# Patient Record
Sex: MALE | Race: OTHER | NOT HISPANIC OR LATINO | URBAN - METROPOLITAN AREA
[De-identification: names, ages, dates, MRNs, and addresses within clinical notes are randomized per-mention and may not be internally consistent; named-entity substitution may affect disease eponyms.]

---

## 2022-01-01 ENCOUNTER — INPATIENT (INPATIENT)
Facility: HOSPITAL | Age: 82
LOS: 5 days | DRG: 870 | End: 2022-01-14
Attending: INTERNAL MEDICINE | Admitting: INTERNAL MEDICINE
Payer: MEDICAID

## 2022-01-01 VITALS — TEMPERATURE: 98 F | OXYGEN SATURATION: 65 %

## 2022-01-01 VITALS
HEART RATE: 91 BPM | HEIGHT: 68 IN | DIASTOLIC BLOOD PRESSURE: 69 MMHG | TEMPERATURE: 100 F | OXYGEN SATURATION: 61 % | WEIGHT: 182.98 LBS | RESPIRATION RATE: 40 BRPM | SYSTOLIC BLOOD PRESSURE: 133 MMHG

## 2022-01-01 DIAGNOSIS — J96.01 ACUTE RESPIRATORY FAILURE WITH HYPOXIA: ICD-10-CM

## 2022-01-01 DIAGNOSIS — Z51.5 ENCOUNTER FOR PALLIATIVE CARE: ICD-10-CM

## 2022-01-01 DIAGNOSIS — N17.9 ACUTE KIDNEY FAILURE, UNSPECIFIED: ICD-10-CM

## 2022-01-01 LAB
A1C WITH ESTIMATED AVERAGE GLUCOSE RESULT: 7.3 % — HIGH (ref 4–5.6)
A1C WITH ESTIMATED AVERAGE GLUCOSE RESULT: 7.3 % — HIGH (ref 4–5.6)
ALBUMIN SERPL ELPH-MCNC: 1.5 G/DL — LOW (ref 3.5–5)
ALBUMIN SERPL ELPH-MCNC: 1.5 G/DL — LOW (ref 3.5–5)
ALBUMIN SERPL ELPH-MCNC: 1.9 G/DL — LOW (ref 3.5–5)
ALBUMIN SERPL ELPH-MCNC: 2 G/DL — LOW (ref 3.5–5)
ALBUMIN SERPL ELPH-MCNC: 2 G/DL — LOW (ref 3.5–5)
ALBUMIN SERPL ELPH-MCNC: 2.1 G/DL — LOW (ref 3.5–5)
ALBUMIN SERPL ELPH-MCNC: 2.5 G/DL — LOW (ref 3.5–5)
ALP SERPL-CCNC: 54 U/L — SIGNIFICANT CHANGE UP (ref 40–120)
ALP SERPL-CCNC: 55 U/L — SIGNIFICANT CHANGE UP (ref 40–120)
ALP SERPL-CCNC: 57 U/L — SIGNIFICANT CHANGE UP (ref 40–120)
ALP SERPL-CCNC: 58 U/L — SIGNIFICANT CHANGE UP (ref 40–120)
ALP SERPL-CCNC: 60 U/L — SIGNIFICANT CHANGE UP (ref 40–120)
ALP SERPL-CCNC: 61 U/L — SIGNIFICANT CHANGE UP (ref 40–120)
ALP SERPL-CCNC: 80 U/L — SIGNIFICANT CHANGE UP (ref 40–120)
ALT FLD-CCNC: 18 U/L DA — SIGNIFICANT CHANGE UP (ref 10–60)
ALT FLD-CCNC: 22 U/L DA — SIGNIFICANT CHANGE UP (ref 10–60)
ALT FLD-CCNC: 27 U/L DA — SIGNIFICANT CHANGE UP (ref 10–60)
ALT FLD-CCNC: 27 U/L DA — SIGNIFICANT CHANGE UP (ref 10–60)
ALT FLD-CCNC: 30 U/L DA — SIGNIFICANT CHANGE UP (ref 10–60)
ALT FLD-CCNC: 32 U/L DA — SIGNIFICANT CHANGE UP (ref 10–60)
ALT FLD-CCNC: 35 U/L DA — SIGNIFICANT CHANGE UP (ref 10–60)
ANION GAP SERPL CALC-SCNC: -1 MMOL/L — LOW (ref 5–17)
ANION GAP SERPL CALC-SCNC: 2 MMOL/L — LOW (ref 5–17)
ANION GAP SERPL CALC-SCNC: 4 MMOL/L — LOW (ref 5–17)
ANION GAP SERPL CALC-SCNC: 5 MMOL/L — SIGNIFICANT CHANGE UP (ref 5–17)
ANION GAP SERPL CALC-SCNC: 6 MMOL/L — SIGNIFICANT CHANGE UP (ref 5–17)
ANION GAP SERPL CALC-SCNC: 6 MMOL/L — SIGNIFICANT CHANGE UP (ref 5–17)
APPEARANCE UR: CLEAR — SIGNIFICANT CHANGE UP
AST SERPL-CCNC: 20 U/L — SIGNIFICANT CHANGE UP (ref 10–40)
AST SERPL-CCNC: 26 U/L — SIGNIFICANT CHANGE UP (ref 10–40)
AST SERPL-CCNC: 33 U/L — SIGNIFICANT CHANGE UP (ref 10–40)
AST SERPL-CCNC: 35 U/L — SIGNIFICANT CHANGE UP (ref 10–40)
AST SERPL-CCNC: 36 U/L — SIGNIFICANT CHANGE UP (ref 10–40)
AST SERPL-CCNC: 38 U/L — SIGNIFICANT CHANGE UP (ref 10–40)
AST SERPL-CCNC: 38 U/L — SIGNIFICANT CHANGE UP (ref 10–40)
BASE EXCESS BLDA CALC-SCNC: -0.1 MMOL/L — SIGNIFICANT CHANGE UP (ref -2–3)
BASE EXCESS BLDA CALC-SCNC: -1.4 MMOL/L — SIGNIFICANT CHANGE UP (ref -2–3)
BASE EXCESS BLDA CALC-SCNC: -7.2 MMOL/L — LOW (ref -2–3)
BASE EXCESS BLDA CALC-SCNC: 0.5 MMOL/L — SIGNIFICANT CHANGE UP (ref -2–3)
BASE EXCESS BLDA CALC-SCNC: 1.7 MMOL/L — SIGNIFICANT CHANGE UP (ref -2–3)
BASE EXCESS BLDA CALC-SCNC: 2.4 MMOL/L — SIGNIFICANT CHANGE UP (ref -2–3)
BASE EXCESS BLDA CALC-SCNC: 3.2 MMOL/L — HIGH (ref -2–3)
BASE EXCESS BLDA CALC-SCNC: 3.9 MMOL/L — HIGH (ref -2–3)
BASE EXCESS BLDA CALC-SCNC: 3.9 MMOL/L — HIGH (ref -2–3)
BASE EXCESS BLDA CALC-SCNC: 4.8 MMOL/L — HIGH (ref -2–3)
BASE EXCESS BLDA CALC-SCNC: 7.4 MMOL/L — HIGH (ref -2–3)
BASE EXCESS BLDA CALC-SCNC: 8 MMOL/L — HIGH (ref -2–3)
BASE EXCESS BLDV CALC-SCNC: 4.1 MMOL/L — SIGNIFICANT CHANGE UP
BASE EXCESS BLDV CALC-SCNC: 7.3 MMOL/L — SIGNIFICANT CHANGE UP
BASOPHILS # BLD AUTO: 0 K/UL — SIGNIFICANT CHANGE UP (ref 0–0.2)
BASOPHILS # BLD AUTO: 0.01 K/UL — SIGNIFICANT CHANGE UP (ref 0–0.2)
BASOPHILS # BLD AUTO: 0.02 K/UL — SIGNIFICANT CHANGE UP (ref 0–0.2)
BASOPHILS # BLD AUTO: 0.02 K/UL — SIGNIFICANT CHANGE UP (ref 0–0.2)
BASOPHILS # BLD AUTO: 0.05 K/UL — SIGNIFICANT CHANGE UP (ref 0–0.2)
BASOPHILS NFR BLD AUTO: 0 % — SIGNIFICANT CHANGE UP (ref 0–2)
BASOPHILS NFR BLD AUTO: 0.1 % — SIGNIFICANT CHANGE UP (ref 0–2)
BASOPHILS NFR BLD AUTO: 0.2 % — SIGNIFICANT CHANGE UP (ref 0–2)
BASOPHILS NFR BLD AUTO: 0.2 % — SIGNIFICANT CHANGE UP (ref 0–2)
BASOPHILS NFR BLD AUTO: 0.3 % — SIGNIFICANT CHANGE UP (ref 0–2)
BILIRUB SERPL-MCNC: 0.6 MG/DL — SIGNIFICANT CHANGE UP (ref 0.2–1.2)
BILIRUB SERPL-MCNC: 0.6 MG/DL — SIGNIFICANT CHANGE UP (ref 0.2–1.2)
BILIRUB SERPL-MCNC: 0.7 MG/DL — SIGNIFICANT CHANGE UP (ref 0.2–1.2)
BILIRUB SERPL-MCNC: 0.7 MG/DL — SIGNIFICANT CHANGE UP (ref 0.2–1.2)
BILIRUB SERPL-MCNC: 0.8 MG/DL — SIGNIFICANT CHANGE UP (ref 0.2–1.2)
BILIRUB SERPL-MCNC: 1 MG/DL — SIGNIFICANT CHANGE UP (ref 0.2–1.2)
BILIRUB SERPL-MCNC: 1.1 MG/DL — SIGNIFICANT CHANGE UP (ref 0.2–1.2)
BILIRUB UR-MCNC: ABNORMAL
BLOOD GAS COMMENTS ARTERIAL: SIGNIFICANT CHANGE UP
BUN SERPL-MCNC: 33 MG/DL — HIGH (ref 7–18)
BUN SERPL-MCNC: 33 MG/DL — HIGH (ref 7–18)
BUN SERPL-MCNC: 34 MG/DL — HIGH (ref 7–18)
BUN SERPL-MCNC: 36 MG/DL — HIGH (ref 7–18)
BUN SERPL-MCNC: 40 MG/DL — HIGH (ref 7–18)
BUN SERPL-MCNC: 46 MG/DL — HIGH (ref 7–18)
BUN SERPL-MCNC: 75 MG/DL — HIGH (ref 7–18)
BUN SERPL-MCNC: 80 MG/DL — HIGH (ref 7–18)
CALCIUM SERPL-MCNC: 7.8 MG/DL — LOW (ref 8.4–10.5)
CALCIUM SERPL-MCNC: 8.1 MG/DL — LOW (ref 8.4–10.5)
CALCIUM SERPL-MCNC: 8.1 MG/DL — LOW (ref 8.4–10.5)
CALCIUM SERPL-MCNC: 8.5 MG/DL — SIGNIFICANT CHANGE UP (ref 8.4–10.5)
CALCIUM SERPL-MCNC: 8.6 MG/DL — SIGNIFICANT CHANGE UP (ref 8.4–10.5)
CALCIUM SERPL-MCNC: 8.9 MG/DL — SIGNIFICANT CHANGE UP (ref 8.4–10.5)
CHLORIDE SERPL-SCNC: 100 MMOL/L — SIGNIFICANT CHANGE UP (ref 96–108)
CHLORIDE SERPL-SCNC: 106 MMOL/L — SIGNIFICANT CHANGE UP (ref 96–108)
CHLORIDE SERPL-SCNC: 108 MMOL/L — SIGNIFICANT CHANGE UP (ref 96–108)
CHLORIDE SERPL-SCNC: 110 MMOL/L — HIGH (ref 96–108)
CHLORIDE SERPL-SCNC: 110 MMOL/L — HIGH (ref 96–108)
CHLORIDE SERPL-SCNC: 111 MMOL/L — HIGH (ref 96–108)
CHLORIDE SERPL-SCNC: 113 MMOL/L — HIGH (ref 96–108)
CHLORIDE SERPL-SCNC: 115 MMOL/L — HIGH (ref 96–108)
CO2 SERPL-SCNC: 30 MMOL/L — SIGNIFICANT CHANGE UP (ref 22–31)
CO2 SERPL-SCNC: 31 MMOL/L — SIGNIFICANT CHANGE UP (ref 22–31)
CO2 SERPL-SCNC: 31 MMOL/L — SIGNIFICANT CHANGE UP (ref 22–31)
CO2 SERPL-SCNC: 32 MMOL/L — HIGH (ref 22–31)
CO2 SERPL-SCNC: 33 MMOL/L — HIGH (ref 22–31)
CO2 SERPL-SCNC: 34 MMOL/L — HIGH (ref 22–31)
COLOR SPEC: YELLOW — SIGNIFICANT CHANGE UP
CREAT SERPL-MCNC: 0.84 MG/DL — SIGNIFICANT CHANGE UP (ref 0.5–1.3)
CREAT SERPL-MCNC: 0.9 MG/DL — SIGNIFICANT CHANGE UP (ref 0.5–1.3)
CREAT SERPL-MCNC: 0.92 MG/DL — SIGNIFICANT CHANGE UP (ref 0.5–1.3)
CREAT SERPL-MCNC: 1.01 MG/DL — SIGNIFICANT CHANGE UP (ref 0.5–1.3)
CREAT SERPL-MCNC: 1.17 MG/DL — SIGNIFICANT CHANGE UP (ref 0.5–1.3)
CREAT SERPL-MCNC: 1.46 MG/DL — HIGH (ref 0.5–1.3)
CREAT SERPL-MCNC: 2.48 MG/DL — HIGH (ref 0.5–1.3)
CREAT SERPL-MCNC: 2.67 MG/DL — HIGH (ref 0.5–1.3)
CRP SERPL-MCNC: 142 MG/L — HIGH
CRP SERPL-MCNC: 149 MG/L — HIGH
CULTURE RESULTS: NO GROWTH — SIGNIFICANT CHANGE UP
CULTURE RESULTS: SIGNIFICANT CHANGE UP
CULTURE RESULTS: SIGNIFICANT CHANGE UP
D DIMER BLD IA.RAPID-MCNC: 1733 NG/ML DDU — HIGH
D DIMER BLD IA.RAPID-MCNC: 2107 NG/ML DDU — HIGH
D DIMER BLD IA.RAPID-MCNC: 2354 NG/ML DDU — HIGH
D DIMER BLD IA.RAPID-MCNC: 385 NG/ML DDU — HIGH
D DIMER BLD IA.RAPID-MCNC: 883 NG/ML DDU — HIGH
DIFF PNL FLD: ABNORMAL
EOSINOPHIL # BLD AUTO: 0 K/UL — SIGNIFICANT CHANGE UP (ref 0–0.5)
EOSINOPHIL # BLD AUTO: 0.01 K/UL — SIGNIFICANT CHANGE UP (ref 0–0.5)
EOSINOPHIL # BLD AUTO: 0.02 K/UL — SIGNIFICANT CHANGE UP (ref 0–0.5)
EOSINOPHIL # BLD AUTO: 0.03 K/UL — SIGNIFICANT CHANGE UP (ref 0–0.5)
EOSINOPHIL # BLD AUTO: 0.15 K/UL — SIGNIFICANT CHANGE UP (ref 0–0.5)
EOSINOPHIL NFR BLD AUTO: 0 % — SIGNIFICANT CHANGE UP (ref 0–6)
EOSINOPHIL NFR BLD AUTO: 0.1 % — SIGNIFICANT CHANGE UP (ref 0–6)
EOSINOPHIL NFR BLD AUTO: 0.1 % — SIGNIFICANT CHANGE UP (ref 0–6)
EOSINOPHIL NFR BLD AUTO: 0.3 % — SIGNIFICANT CHANGE UP (ref 0–6)
EOSINOPHIL NFR BLD AUTO: 1.4 % — SIGNIFICANT CHANGE UP (ref 0–6)
ESTIMATED AVERAGE GLUCOSE: 163 MG/DL — HIGH (ref 68–114)
ESTIMATED AVERAGE GLUCOSE: 163 MG/DL — HIGH (ref 68–114)
FERRITIN SERPL-MCNC: 499 NG/ML — HIGH (ref 30–400)
FERRITIN SERPL-MCNC: 515 NG/ML — HIGH (ref 30–400)
FERRITIN SERPL-MCNC: 563 NG/ML — HIGH (ref 30–400)
GLUCOSE BLDC GLUCOMTR-MCNC: 109 MG/DL — HIGH (ref 70–99)
GLUCOSE BLDC GLUCOMTR-MCNC: 119 MG/DL — HIGH (ref 70–99)
GLUCOSE BLDC GLUCOMTR-MCNC: 120 MG/DL — HIGH (ref 70–99)
GLUCOSE BLDC GLUCOMTR-MCNC: 123 MG/DL — HIGH (ref 70–99)
GLUCOSE BLDC GLUCOMTR-MCNC: 128 MG/DL — HIGH (ref 70–99)
GLUCOSE BLDC GLUCOMTR-MCNC: 134 MG/DL — HIGH (ref 70–99)
GLUCOSE BLDC GLUCOMTR-MCNC: 148 MG/DL — HIGH (ref 70–99)
GLUCOSE BLDC GLUCOMTR-MCNC: 150 MG/DL — HIGH (ref 70–99)
GLUCOSE BLDC GLUCOMTR-MCNC: 153 MG/DL — HIGH (ref 70–99)
GLUCOSE BLDC GLUCOMTR-MCNC: 157 MG/DL — HIGH (ref 70–99)
GLUCOSE BLDC GLUCOMTR-MCNC: 161 MG/DL — HIGH (ref 70–99)
GLUCOSE BLDC GLUCOMTR-MCNC: 162 MG/DL — HIGH (ref 70–99)
GLUCOSE BLDC GLUCOMTR-MCNC: 164 MG/DL — HIGH (ref 70–99)
GLUCOSE BLDC GLUCOMTR-MCNC: 173 MG/DL — HIGH (ref 70–99)
GLUCOSE BLDC GLUCOMTR-MCNC: 175 MG/DL — HIGH (ref 70–99)
GLUCOSE BLDC GLUCOMTR-MCNC: 180 MG/DL — HIGH (ref 70–99)
GLUCOSE BLDC GLUCOMTR-MCNC: 184 MG/DL — HIGH (ref 70–99)
GLUCOSE BLDC GLUCOMTR-MCNC: 191 MG/DL — HIGH (ref 70–99)
GLUCOSE BLDC GLUCOMTR-MCNC: 201 MG/DL — HIGH (ref 70–99)
GLUCOSE BLDC GLUCOMTR-MCNC: 226 MG/DL — HIGH (ref 70–99)
GLUCOSE BLDC GLUCOMTR-MCNC: 92 MG/DL — SIGNIFICANT CHANGE UP (ref 70–99)
GLUCOSE SERPL-MCNC: 119 MG/DL — HIGH (ref 70–99)
GLUCOSE SERPL-MCNC: 120 MG/DL — HIGH (ref 70–99)
GLUCOSE SERPL-MCNC: 138 MG/DL — HIGH (ref 70–99)
GLUCOSE SERPL-MCNC: 164 MG/DL — HIGH (ref 70–99)
GLUCOSE SERPL-MCNC: 198 MG/DL — HIGH (ref 70–99)
GLUCOSE SERPL-MCNC: 225 MG/DL — HIGH (ref 70–99)
GLUCOSE SERPL-MCNC: 228 MG/DL — HIGH (ref 70–99)
GLUCOSE SERPL-MCNC: 95 MG/DL — SIGNIFICANT CHANGE UP (ref 70–99)
GLUCOSE UR QL: NEGATIVE — SIGNIFICANT CHANGE UP
HCO3 BLDA-SCNC: 26 MMOL/L — SIGNIFICANT CHANGE UP (ref 21–28)
HCO3 BLDA-SCNC: 28 MMOL/L — SIGNIFICANT CHANGE UP (ref 21–28)
HCO3 BLDA-SCNC: 30 MMOL/L — HIGH (ref 21–28)
HCO3 BLDA-SCNC: 30 MMOL/L — HIGH (ref 21–28)
HCO3 BLDA-SCNC: 31 MMOL/L — HIGH (ref 21–28)
HCO3 BLDA-SCNC: 32 MMOL/L — HIGH (ref 21–28)
HCO3 BLDA-SCNC: 32 MMOL/L — HIGH (ref 21–28)
HCO3 BLDA-SCNC: 33 MMOL/L — HIGH (ref 21–28)
HCO3 BLDA-SCNC: 33 MMOL/L — HIGH (ref 21–28)
HCO3 BLDA-SCNC: 36 MMOL/L — HIGH (ref 21–28)
HCO3 BLDV-SCNC: 34 MMOL/L — HIGH (ref 22–29)
HCO3 BLDV-SCNC: 36 MMOL/L — HIGH (ref 22–29)
HCT VFR BLD CALC: 50.8 % — HIGH (ref 39–50)
HCT VFR BLD CALC: 52.2 % — HIGH (ref 39–50)
HCT VFR BLD CALC: 52.2 % — HIGH (ref 39–50)
HCT VFR BLD CALC: 52.4 % — HIGH (ref 39–50)
HCT VFR BLD CALC: 54.5 % — HIGH (ref 39–50)
HCT VFR BLD CALC: 55.3 % — HIGH (ref 39–50)
HCT VFR BLD CALC: 61.5 % — CRITICAL HIGH (ref 39–50)
HGB BLD-MCNC: 15.2 G/DL — SIGNIFICANT CHANGE UP (ref 13–17)
HGB BLD-MCNC: 15.6 G/DL — SIGNIFICANT CHANGE UP (ref 13–17)
HGB BLD-MCNC: 15.8 G/DL — SIGNIFICANT CHANGE UP (ref 13–17)
HGB BLD-MCNC: 15.9 G/DL — SIGNIFICANT CHANGE UP (ref 13–17)
HGB BLD-MCNC: 16.1 G/DL — SIGNIFICANT CHANGE UP (ref 13–17)
HGB BLD-MCNC: 16.9 G/DL — SIGNIFICANT CHANGE UP (ref 13–17)
HGB BLD-MCNC: 17.9 G/DL — HIGH (ref 13–17)
HOROWITZ INDEX BLDA+IHG-RTO: 100 — SIGNIFICANT CHANGE UP
HOROWITZ INDEX BLDA+IHG-RTO: 70 — SIGNIFICANT CHANGE UP
HOROWITZ INDEX BLDA+IHG-RTO: 80 — SIGNIFICANT CHANGE UP
IMM GRANULOCYTES NFR BLD AUTO: 0.4 % — SIGNIFICANT CHANGE UP (ref 0–1.5)
IMM GRANULOCYTES NFR BLD AUTO: 0.5 % — SIGNIFICANT CHANGE UP (ref 0–1.5)
IMM GRANULOCYTES NFR BLD AUTO: 0.6 % — SIGNIFICANT CHANGE UP (ref 0–1.5)
IMM GRANULOCYTES NFR BLD AUTO: 1.1 % — SIGNIFICANT CHANGE UP (ref 0–1.5)
IMM GRANULOCYTES NFR BLD AUTO: 1.4 % — SIGNIFICANT CHANGE UP (ref 0–1.5)
IMM GRANULOCYTES NFR BLD AUTO: 3.3 % — HIGH (ref 0–1.5)
KETONES UR-MCNC: ABNORMAL
LACTATE SERPL-SCNC: 1.3 MMOL/L — SIGNIFICANT CHANGE UP (ref 0.7–2)
LDH SERPL L TO P-CCNC: 352 U/L — HIGH (ref 120–225)
LDH SERPL L TO P-CCNC: 360 U/L — HIGH (ref 120–225)
LEUKOCYTE ESTERASE UR-ACNC: ABNORMAL
LYMPHOCYTES # BLD AUTO: 0.58 K/UL — LOW (ref 1–3.3)
LYMPHOCYTES # BLD AUTO: 0.65 K/UL — LOW (ref 1–3.3)
LYMPHOCYTES # BLD AUTO: 0.67 K/UL — LOW (ref 1–3.3)
LYMPHOCYTES # BLD AUTO: 0.71 K/UL — LOW (ref 1–3.3)
LYMPHOCYTES # BLD AUTO: 0.91 K/UL — LOW (ref 1–3.3)
LYMPHOCYTES # BLD AUTO: 0.93 K/UL — LOW (ref 1–3.3)
LYMPHOCYTES # BLD AUTO: 1.02 K/UL — SIGNIFICANT CHANGE UP (ref 1–3.3)
LYMPHOCYTES # BLD AUTO: 5.9 % — LOW (ref 13–44)
LYMPHOCYTES # BLD AUTO: 6.1 % — LOW (ref 13–44)
LYMPHOCYTES # BLD AUTO: 6.2 % — LOW (ref 13–44)
LYMPHOCYTES # BLD AUTO: 7 % — LOW (ref 13–44)
LYMPHOCYTES # BLD AUTO: 7.8 % — LOW (ref 13–44)
LYMPHOCYTES # BLD AUTO: 8.1 % — LOW (ref 13–44)
LYMPHOCYTES # BLD AUTO: 9.3 % — LOW (ref 13–44)
MAGNESIUM SERPL-MCNC: 2.4 MG/DL — SIGNIFICANT CHANGE UP (ref 1.6–2.6)
MAGNESIUM SERPL-MCNC: 2.5 MG/DL — SIGNIFICANT CHANGE UP (ref 1.6–2.6)
MAGNESIUM SERPL-MCNC: 2.7 MG/DL — HIGH (ref 1.6–2.6)
MAGNESIUM SERPL-MCNC: 2.8 MG/DL — HIGH (ref 1.6–2.6)
MAGNESIUM SERPL-MCNC: 2.8 MG/DL — HIGH (ref 1.6–2.6)
MAGNESIUM SERPL-MCNC: 2.9 MG/DL — HIGH (ref 1.6–2.6)
MAGNESIUM SERPL-MCNC: 3.3 MG/DL — HIGH (ref 1.6–2.6)
MCHC RBC-ENTMCNC: 29.1 GM/DL — LOW (ref 32–36)
MCHC RBC-ENTMCNC: 29.1 GM/DL — LOW (ref 32–36)
MCHC RBC-ENTMCNC: 29.6 PG — SIGNIFICANT CHANGE UP (ref 27–34)
MCHC RBC-ENTMCNC: 29.9 GM/DL — LOW (ref 32–36)
MCHC RBC-ENTMCNC: 29.9 GM/DL — LOW (ref 32–36)
MCHC RBC-ENTMCNC: 30.2 PG — SIGNIFICANT CHANGE UP (ref 27–34)
MCHC RBC-ENTMCNC: 30.3 GM/DL — LOW (ref 32–36)
MCHC RBC-ENTMCNC: 30.3 GM/DL — LOW (ref 32–36)
MCHC RBC-ENTMCNC: 30.3 PG — SIGNIFICANT CHANGE UP (ref 27–34)
MCHC RBC-ENTMCNC: 31 GM/DL — LOW (ref 32–36)
MCV RBC AUTO: 101.2 FL — HIGH (ref 80–100)
MCV RBC AUTO: 101.4 FL — HIGH (ref 80–100)
MCV RBC AUTO: 101.7 FL — HIGH (ref 80–100)
MCV RBC AUTO: 104.1 FL — HIGH (ref 80–100)
MCV RBC AUTO: 97.3 FL — SIGNIFICANT CHANGE UP (ref 80–100)
MCV RBC AUTO: 99.6 FL — SIGNIFICANT CHANGE UP (ref 80–100)
MCV RBC AUTO: 99.8 FL — SIGNIFICANT CHANGE UP (ref 80–100)
MONOCYTES # BLD AUTO: 0.47 K/UL — SIGNIFICANT CHANGE UP (ref 0–0.9)
MONOCYTES # BLD AUTO: 0.52 K/UL — SIGNIFICANT CHANGE UP (ref 0–0.9)
MONOCYTES # BLD AUTO: 0.53 K/UL — SIGNIFICANT CHANGE UP (ref 0–0.9)
MONOCYTES # BLD AUTO: 0.62 K/UL — SIGNIFICANT CHANGE UP (ref 0–0.9)
MONOCYTES # BLD AUTO: 0.69 K/UL — SIGNIFICANT CHANGE UP (ref 0–0.9)
MONOCYTES # BLD AUTO: 0.75 K/UL — SIGNIFICANT CHANGE UP (ref 0–0.9)
MONOCYTES # BLD AUTO: 1.16 K/UL — HIGH (ref 0–0.9)
MONOCYTES NFR BLD AUTO: 4 % — SIGNIFICANT CHANGE UP (ref 2–14)
MONOCYTES NFR BLD AUTO: 4.8 % — SIGNIFICANT CHANGE UP (ref 2–14)
MONOCYTES NFR BLD AUTO: 5.3 % — SIGNIFICANT CHANGE UP (ref 2–14)
MONOCYTES NFR BLD AUTO: 6.6 % — SIGNIFICANT CHANGE UP (ref 2–14)
MONOCYTES NFR BLD AUTO: 7.1 % — SIGNIFICANT CHANGE UP (ref 2–14)
MONOCYTES NFR BLD AUTO: 7.3 % — SIGNIFICANT CHANGE UP (ref 2–14)
MONOCYTES NFR BLD AUTO: 8 % — SIGNIFICANT CHANGE UP (ref 2–14)
MRSA PCR RESULT.: SIGNIFICANT CHANGE UP
NEUTROPHILS # BLD AUTO: 11.52 K/UL — HIGH (ref 1.8–7.4)
NEUTROPHILS # BLD AUTO: 13.14 K/UL — HIGH (ref 1.8–7.4)
NEUTROPHILS # BLD AUTO: 7.22 K/UL — SIGNIFICANT CHANGE UP (ref 1.8–7.4)
NEUTROPHILS # BLD AUTO: 7.57 K/UL — HIGH (ref 1.8–7.4)
NEUTROPHILS # BLD AUTO: 8.12 K/UL — HIGH (ref 1.8–7.4)
NEUTROPHILS # BLD AUTO: 9.02 K/UL — HIGH (ref 1.8–7.4)
NEUTROPHILS # BLD AUTO: 9.13 K/UL — HIGH (ref 1.8–7.4)
NEUTROPHILS NFR BLD AUTO: 83.1 % — HIGH (ref 43–77)
NEUTROPHILS NFR BLD AUTO: 83.2 % — HIGH (ref 43–77)
NEUTROPHILS NFR BLD AUTO: 83.5 % — HIGH (ref 43–77)
NEUTROPHILS NFR BLD AUTO: 84.9 % — HIGH (ref 43–77)
NEUTROPHILS NFR BLD AUTO: 85.9 % — HIGH (ref 43–77)
NEUTROPHILS NFR BLD AUTO: 86.7 % — HIGH (ref 43–77)
NEUTROPHILS NFR BLD AUTO: 87 % — HIGH (ref 43–77)
NITRITE UR-MCNC: POSITIVE
NRBC # BLD: 0 /100 WBCS — SIGNIFICANT CHANGE UP (ref 0–0)
NRBC # BLD: 0 /100 WBCS — SIGNIFICANT CHANGE UP (ref 0–0)
NRBC # BLD: 1 /100 WBCS — HIGH (ref 0–0)
NRBC # BLD: 1 /100 WBCS — HIGH (ref 0–0)
NRBC # BLD: 2 /100 WBCS — HIGH (ref 0–0)
NRBC # BLD: 2 /100 WBCS — HIGH (ref 0–0)
PCO2 BLDA: 105 MMHG — CRITICAL HIGH (ref 35–48)
PCO2 BLDA: 36 MMHG — SIGNIFICANT CHANGE UP (ref 35–48)
PCO2 BLDA: 51 MMHG — HIGH (ref 35–48)
PCO2 BLDA: 58 MMHG — HIGH (ref 35–48)
PCO2 BLDA: 66 MMHG — HIGH (ref 35–48)
PCO2 BLDA: 68 MMHG — HIGH (ref 35–48)
PCO2 BLDA: 70 MMHG — CRITICAL HIGH (ref 35–48)
PCO2 BLDA: 70 MMHG — CRITICAL HIGH (ref 35–48)
PCO2 BLDA: 75 MMHG — CRITICAL HIGH (ref 35–48)
PCO2 BLDA: 77 MMHG — CRITICAL HIGH (ref 35–48)
PCO2 BLDA: 79 MMHG — CRITICAL HIGH (ref 35–48)
PCO2 BLDA: 83 MMHG — CRITICAL HIGH (ref 35–48)
PCO2 BLDV: 72 MMHG — HIGH (ref 42–55)
PCO2 BLDV: 75 MMHG — HIGH (ref 42–55)
PH BLDA: 7.01 — CRITICAL LOW (ref 7.35–7.45)
PH BLDA: 7.16 — CRITICAL LOW (ref 7.35–7.45)
PH BLDA: 7.2 — CRITICAL LOW (ref 7.35–7.45)
PH BLDA: 7.2 — CRITICAL LOW (ref 7.35–7.45)
PH BLDA: 7.24 — LOW (ref 7.35–7.45)
PH BLDA: 7.28 — LOW (ref 7.35–7.45)
PH BLDA: 7.28 — LOW (ref 7.35–7.45)
PH BLDA: 7.31 — LOW (ref 7.35–7.45)
PH BLDA: 7.32 — LOW (ref 7.35–7.45)
PH BLDA: 7.34 — LOW (ref 7.35–7.45)
PH BLDA: 7.35 — SIGNIFICANT CHANGE UP (ref 7.35–7.45)
PH BLDA: 7.54 — HIGH (ref 7.35–7.45)
PH BLDV: 7.26 — LOW (ref 7.32–7.43)
PH BLDV: 7.31 — LOW (ref 7.32–7.43)
PH UR: 5 — SIGNIFICANT CHANGE UP (ref 5–8)
PHOSPHATE SERPL-MCNC: 3 MG/DL — SIGNIFICANT CHANGE UP (ref 2.5–4.5)
PHOSPHATE SERPL-MCNC: 3.7 MG/DL — SIGNIFICANT CHANGE UP (ref 2.5–4.5)
PHOSPHATE SERPL-MCNC: 3.8 MG/DL — SIGNIFICANT CHANGE UP (ref 2.5–4.5)
PHOSPHATE SERPL-MCNC: 3.9 MG/DL — SIGNIFICANT CHANGE UP (ref 2.5–4.5)
PHOSPHATE SERPL-MCNC: 4.1 MG/DL — SIGNIFICANT CHANGE UP (ref 2.5–4.5)
PHOSPHATE SERPL-MCNC: 4.3 MG/DL — SIGNIFICANT CHANGE UP (ref 2.5–4.5)
PHOSPHATE SERPL-MCNC: 5.8 MG/DL — HIGH (ref 2.5–4.5)
PLATELET # BLD AUTO: 168 K/UL — SIGNIFICANT CHANGE UP (ref 150–400)
PLATELET # BLD AUTO: 199 K/UL — SIGNIFICANT CHANGE UP (ref 150–400)
PLATELET # BLD AUTO: 200 K/UL — SIGNIFICANT CHANGE UP (ref 150–400)
PLATELET # BLD AUTO: 233 K/UL — SIGNIFICANT CHANGE UP (ref 150–400)
PLATELET # BLD AUTO: 241 K/UL — SIGNIFICANT CHANGE UP (ref 150–400)
PLATELET # BLD AUTO: 256 K/UL — SIGNIFICANT CHANGE UP (ref 150–400)
PLATELET # BLD AUTO: 320 K/UL — SIGNIFICANT CHANGE UP (ref 150–400)
PO2 BLDA: 103 MMHG — SIGNIFICANT CHANGE UP (ref 83–108)
PO2 BLDA: 112 MMHG — HIGH (ref 83–108)
PO2 BLDA: 151 MMHG — HIGH (ref 83–108)
PO2 BLDA: 166 MMHG — HIGH (ref 83–108)
PO2 BLDA: 227 MMHG — HIGH (ref 83–108)
PO2 BLDA: 42 MMHG — CRITICAL LOW (ref 83–108)
PO2 BLDA: 53 MMHG — LOW (ref 83–108)
PO2 BLDA: 57 MMHG — LOW (ref 83–108)
PO2 BLDA: 59 MMHG — LOW (ref 83–108)
PO2 BLDA: 75 MMHG — LOW (ref 83–108)
PO2 BLDA: 85 MMHG — SIGNIFICANT CHANGE UP (ref 83–108)
PO2 BLDA: 94 MMHG — SIGNIFICANT CHANGE UP (ref 83–108)
PO2 BLDV: 41 MMHG — SIGNIFICANT CHANGE UP
PO2 BLDV: 46 MMHG — SIGNIFICANT CHANGE UP
POTASSIUM SERPL-MCNC: 4.7 MMOL/L — SIGNIFICANT CHANGE UP (ref 3.5–5.3)
POTASSIUM SERPL-MCNC: 4.7 MMOL/L — SIGNIFICANT CHANGE UP (ref 3.5–5.3)
POTASSIUM SERPL-MCNC: 4.9 MMOL/L — SIGNIFICANT CHANGE UP (ref 3.5–5.3)
POTASSIUM SERPL-MCNC: 4.9 MMOL/L — SIGNIFICANT CHANGE UP (ref 3.5–5.3)
POTASSIUM SERPL-MCNC: 5 MMOL/L — SIGNIFICANT CHANGE UP (ref 3.5–5.3)
POTASSIUM SERPL-MCNC: 5.1 MMOL/L — SIGNIFICANT CHANGE UP (ref 3.5–5.3)
POTASSIUM SERPL-MCNC: 5.9 MMOL/L — HIGH (ref 3.5–5.3)
POTASSIUM SERPL-MCNC: 6.4 MMOL/L — CRITICAL HIGH (ref 3.5–5.3)
POTASSIUM SERPL-SCNC: 4.7 MMOL/L — SIGNIFICANT CHANGE UP (ref 3.5–5.3)
POTASSIUM SERPL-SCNC: 4.7 MMOL/L — SIGNIFICANT CHANGE UP (ref 3.5–5.3)
POTASSIUM SERPL-SCNC: 4.9 MMOL/L — SIGNIFICANT CHANGE UP (ref 3.5–5.3)
POTASSIUM SERPL-SCNC: 4.9 MMOL/L — SIGNIFICANT CHANGE UP (ref 3.5–5.3)
POTASSIUM SERPL-SCNC: 5 MMOL/L — SIGNIFICANT CHANGE UP (ref 3.5–5.3)
POTASSIUM SERPL-SCNC: 5.1 MMOL/L — SIGNIFICANT CHANGE UP (ref 3.5–5.3)
POTASSIUM SERPL-SCNC: 5.9 MMOL/L — HIGH (ref 3.5–5.3)
POTASSIUM SERPL-SCNC: 6.4 MMOL/L — CRITICAL HIGH (ref 3.5–5.3)
PROCALCITONIN SERPL-MCNC: 0.2 NG/ML — HIGH (ref 0.02–0.1)
PROCALCITONIN SERPL-MCNC: 0.22 NG/ML — HIGH (ref 0.02–0.1)
PROCALCITONIN SERPL-MCNC: 0.37 NG/ML — HIGH (ref 0.02–0.1)
PROCALCITONIN SERPL-MCNC: 1.8 NG/ML — HIGH (ref 0.02–0.1)
PROT SERPL-MCNC: 5.2 G/DL — LOW (ref 6–8.3)
PROT SERPL-MCNC: 5.7 G/DL — LOW (ref 6–8.3)
PROT SERPL-MCNC: 5.8 G/DL — LOW (ref 6–8.3)
PROT SERPL-MCNC: 5.8 G/DL — LOW (ref 6–8.3)
PROT SERPL-MCNC: 5.9 G/DL — LOW (ref 6–8.3)
PROT SERPL-MCNC: 6 G/DL — SIGNIFICANT CHANGE UP (ref 6–8.3)
PROT SERPL-MCNC: 7 G/DL — SIGNIFICANT CHANGE UP (ref 6–8.3)
PROT UR-MCNC: 500 MG/DL
RAPID RVP RESULT: DETECTED
RBC # BLD: 5.01 M/UL — SIGNIFICANT CHANGE UP (ref 4.2–5.8)
RBC # BLD: 5.16 M/UL — SIGNIFICANT CHANGE UP (ref 4.2–5.8)
RBC # BLD: 5.24 M/UL — SIGNIFICANT CHANGE UP (ref 4.2–5.8)
RBC # BLD: 5.25 M/UL — SIGNIFICANT CHANGE UP (ref 4.2–5.8)
RBC # BLD: 5.44 M/UL — SIGNIFICANT CHANGE UP (ref 4.2–5.8)
RBC # BLD: 5.6 M/UL — SIGNIFICANT CHANGE UP (ref 4.2–5.8)
RBC # BLD: 5.91 M/UL — HIGH (ref 4.2–5.8)
RBC # FLD: 13.3 % — SIGNIFICANT CHANGE UP (ref 10.3–14.5)
RBC # FLD: 13.4 % — SIGNIFICANT CHANGE UP (ref 10.3–14.5)
RBC # FLD: 13.5 % — SIGNIFICANT CHANGE UP (ref 10.3–14.5)
RBC # FLD: 13.6 % — SIGNIFICANT CHANGE UP (ref 10.3–14.5)
RBC # FLD: 13.6 % — SIGNIFICANT CHANGE UP (ref 10.3–14.5)
RBC # FLD: 13.8 % — SIGNIFICANT CHANGE UP (ref 10.3–14.5)
RBC # FLD: 14 % — SIGNIFICANT CHANGE UP (ref 10.3–14.5)
S AUREUS DNA NOSE QL NAA+PROBE: SIGNIFICANT CHANGE UP
SAO2 % BLDA: 100 % — SIGNIFICANT CHANGE UP
SAO2 % BLDA: 66 % — SIGNIFICANT CHANGE UP
SAO2 % BLDA: 83 % — SIGNIFICANT CHANGE UP
SAO2 % BLDA: 88 % — SIGNIFICANT CHANGE UP
SAO2 % BLDA: 89 % — SIGNIFICANT CHANGE UP
SAO2 % BLDA: 95 % — SIGNIFICANT CHANGE UP
SAO2 % BLDA: 97 % — SIGNIFICANT CHANGE UP
SAO2 % BLDA: 98 % — SIGNIFICANT CHANGE UP
SAO2 % BLDA: 99 % — SIGNIFICANT CHANGE UP
SAO2 % BLDA: 99 % — SIGNIFICANT CHANGE UP
SAO2 % BLDV: 65.7 % — SIGNIFICANT CHANGE UP
SAO2 % BLDV: 76.8 % — SIGNIFICANT CHANGE UP
SARS-COV-2 RNA SPEC QL NAA+PROBE: DETECTED
SODIUM SERPL-SCNC: 140 MMOL/L — SIGNIFICANT CHANGE UP (ref 135–145)
SODIUM SERPL-SCNC: 142 MMOL/L — SIGNIFICANT CHANGE UP (ref 135–145)
SODIUM SERPL-SCNC: 143 MMOL/L — SIGNIFICANT CHANGE UP (ref 135–145)
SODIUM SERPL-SCNC: 144 MMOL/L — SIGNIFICANT CHANGE UP (ref 135–145)
SODIUM SERPL-SCNC: 145 MMOL/L — SIGNIFICANT CHANGE UP (ref 135–145)
SODIUM SERPL-SCNC: 147 MMOL/L — HIGH (ref 135–145)
SODIUM SERPL-SCNC: 147 MMOL/L — HIGH (ref 135–145)
SODIUM SERPL-SCNC: 148 MMOL/L — HIGH (ref 135–145)
SP GR SPEC: 1.02 — SIGNIFICANT CHANGE UP (ref 1.01–1.02)
SPECIMEN SOURCE: SIGNIFICANT CHANGE UP
TRIGL SERPL-MCNC: 247 MG/DL — HIGH
TROPONIN I, HIGH SENSITIVITY RESULT: 104.5 NG/L — HIGH
TROPONIN I, HIGH SENSITIVITY RESULT: 122.1 NG/L — HIGH
TROPONIN I, HIGH SENSITIVITY RESULT: 127 NG/L — HIGH
TROPONIN I, HIGH SENSITIVITY RESULT: 151.5 NG/L — HIGH
UROBILINOGEN FLD QL: 4
WBC # BLD: 10.62 K/UL — HIGH (ref 3.8–10.5)
WBC # BLD: 10.97 K/UL — HIGH (ref 3.8–10.5)
WBC # BLD: 12.94 K/UL — HIGH (ref 3.8–10.5)
WBC # BLD: 15.82 K/UL — HIGH (ref 3.8–10.5)
WBC # BLD: 8.64 K/UL — SIGNIFICANT CHANGE UP (ref 3.8–10.5)
WBC # BLD: 8.81 K/UL — SIGNIFICANT CHANGE UP (ref 3.8–10.5)
WBC # BLD: 9.37 K/UL — SIGNIFICANT CHANGE UP (ref 3.8–10.5)
WBC # FLD AUTO: 10.62 K/UL — HIGH (ref 3.8–10.5)
WBC # FLD AUTO: 10.97 K/UL — HIGH (ref 3.8–10.5)
WBC # FLD AUTO: 12.94 K/UL — HIGH (ref 3.8–10.5)
WBC # FLD AUTO: 15.82 K/UL — HIGH (ref 3.8–10.5)
WBC # FLD AUTO: 8.64 K/UL — SIGNIFICANT CHANGE UP (ref 3.8–10.5)
WBC # FLD AUTO: 8.81 K/UL — SIGNIFICANT CHANGE UP (ref 3.8–10.5)
WBC # FLD AUTO: 9.37 K/UL — SIGNIFICANT CHANGE UP (ref 3.8–10.5)

## 2022-01-01 PROCEDURE — 71045 X-RAY EXAM CHEST 1 VIEW: CPT | Mod: 26

## 2022-01-01 PROCEDURE — 71045 X-RAY EXAM CHEST 1 VIEW: CPT | Mod: 26,77

## 2022-01-01 PROCEDURE — 99291 CRITICAL CARE FIRST HOUR: CPT

## 2022-01-01 PROCEDURE — 71045 X-RAY EXAM CHEST 1 VIEW: CPT | Mod: 26,76

## 2022-01-01 PROCEDURE — 99497 ADVNCD CARE PLAN 30 MIN: CPT

## 2022-01-01 PROCEDURE — 93010 ELECTROCARDIOGRAM REPORT: CPT

## 2022-01-01 PROCEDURE — 99255 IP/OBS CONSLTJ NEW/EST HI 80: CPT | Mod: 25

## 2022-01-01 RX ORDER — NOREPINEPHRINE BITARTRATE/D5W 8 MG/250ML
0.05 PLASTIC BAG, INJECTION (ML) INTRAVENOUS
Qty: 8 | Refills: 0 | Status: DISCONTINUED | OUTPATIENT
Start: 2022-01-01 | End: 2022-01-01

## 2022-01-01 RX ORDER — SODIUM CHLORIDE 9 MG/ML
10 INJECTION INTRAMUSCULAR; INTRAVENOUS; SUBCUTANEOUS
Refills: 0 | Status: DISCONTINUED | OUTPATIENT
Start: 2022-01-01 | End: 2022-01-01

## 2022-01-01 RX ORDER — CISATRACURIUM BESYLATE 2 MG/ML
3 INJECTION INTRAVENOUS
Qty: 200 | Refills: 0 | Status: DISCONTINUED | OUTPATIENT
Start: 2022-01-01 | End: 2022-01-01

## 2022-01-01 RX ORDER — ENOXAPARIN SODIUM 100 MG/ML
40 INJECTION SUBCUTANEOUS DAILY
Refills: 0 | Status: DISCONTINUED | OUTPATIENT
Start: 2022-01-01 | End: 2022-01-01

## 2022-01-01 RX ORDER — DEXAMETHASONE 0.5 MG/5ML
6 ELIXIR ORAL DAILY
Refills: 0 | Status: DISCONTINUED | OUTPATIENT
Start: 2022-01-01 | End: 2022-01-01

## 2022-01-01 RX ORDER — CALCIUM GLUCONATE 100 MG/ML
2 VIAL (ML) INTRAVENOUS ONCE
Refills: 0 | Status: COMPLETED | OUTPATIENT
Start: 2022-01-01 | End: 2022-01-01

## 2022-01-01 RX ORDER — PROPOFOL 10 MG/ML
40 INJECTION, EMULSION INTRAVENOUS
Qty: 1000 | Refills: 0 | Status: DISCONTINUED | OUTPATIENT
Start: 2022-01-01 | End: 2022-01-01

## 2022-01-01 RX ORDER — FUROSEMIDE 40 MG
40 TABLET ORAL ONCE
Refills: 0 | Status: COMPLETED | OUTPATIENT
Start: 2022-01-01 | End: 2022-01-01

## 2022-01-01 RX ORDER — INSULIN HUMAN 100 [IU]/ML
10 INJECTION, SOLUTION SUBCUTANEOUS ONCE
Refills: 0 | Status: COMPLETED | OUTPATIENT
Start: 2022-01-01 | End: 2022-01-01

## 2022-01-01 RX ORDER — AZITHROMYCIN 500 MG/1
TABLET, FILM COATED ORAL
Refills: 0 | Status: DISCONTINUED | OUTPATIENT
Start: 2022-01-01 | End: 2022-01-01

## 2022-01-01 RX ORDER — SODIUM CHLORIDE 9 MG/ML
1000 INJECTION INTRAMUSCULAR; INTRAVENOUS; SUBCUTANEOUS
Refills: 0 | Status: DISCONTINUED | OUTPATIENT
Start: 2022-01-01 | End: 2022-01-01

## 2022-01-01 RX ORDER — INSULIN LISPRO 100/ML
VIAL (ML) SUBCUTANEOUS
Refills: 0 | Status: DISCONTINUED | OUTPATIENT
Start: 2022-01-01 | End: 2022-01-01

## 2022-01-01 RX ORDER — CEFTRIAXONE 500 MG/1
1000 INJECTION, POWDER, FOR SOLUTION INTRAMUSCULAR; INTRAVENOUS EVERY 24 HOURS
Refills: 0 | Status: DISCONTINUED | OUTPATIENT
Start: 2022-01-01 | End: 2022-01-01

## 2022-01-01 RX ORDER — SODIUM CHLORIDE 9 MG/ML
1000 INJECTION, SOLUTION INTRAVENOUS
Refills: 0 | Status: DISCONTINUED | OUTPATIENT
Start: 2022-01-01 | End: 2022-01-01

## 2022-01-01 RX ORDER — SENNA PLUS 8.6 MG/1
10 TABLET ORAL DAILY
Refills: 0 | Status: DISCONTINUED | OUTPATIENT
Start: 2022-01-01 | End: 2022-01-01

## 2022-01-01 RX ORDER — CEFEPIME 1 G/1
1000 INJECTION, POWDER, FOR SOLUTION INTRAMUSCULAR; INTRAVENOUS ONCE
Refills: 0 | Status: COMPLETED | OUTPATIENT
Start: 2022-01-01 | End: 2022-01-01

## 2022-01-01 RX ORDER — FENTANYL CITRATE 50 UG/ML
2 INJECTION INTRAVENOUS
Qty: 2500 | Refills: 0 | Status: DISCONTINUED | OUTPATIENT
Start: 2022-01-01 | End: 2022-01-01

## 2022-01-01 RX ORDER — CHLORHEXIDINE GLUCONATE 213 G/1000ML
1 SOLUTION TOPICAL
Refills: 0 | Status: DISCONTINUED | OUTPATIENT
Start: 2022-01-01 | End: 2022-01-01

## 2022-01-01 RX ORDER — SODIUM CHLORIDE 9 MG/ML
2500 INJECTION INTRAMUSCULAR; INTRAVENOUS; SUBCUTANEOUS ONCE
Refills: 0 | Status: COMPLETED | OUTPATIENT
Start: 2022-01-01 | End: 2022-01-01

## 2022-01-01 RX ORDER — HEPARIN SODIUM 5000 [USP'U]/ML
5000 INJECTION INTRAVENOUS; SUBCUTANEOUS EVERY 8 HOURS
Refills: 0 | Status: DISCONTINUED | OUTPATIENT
Start: 2022-01-01 | End: 2022-01-01

## 2022-01-01 RX ORDER — KETAMINE HYDROCHLORIDE 100 MG/ML
40 INJECTION INTRAMUSCULAR; INTRAVENOUS ONCE
Refills: 0 | Status: DISCONTINUED | OUTPATIENT
Start: 2022-01-01 | End: 2022-01-01

## 2022-01-01 RX ORDER — KETAMINE HYDROCHLORIDE 100 MG/ML
0.3 INJECTION INTRAMUSCULAR; INTRAVENOUS
Qty: 1000 | Refills: 0 | Status: DISCONTINUED | OUTPATIENT
Start: 2022-01-01 | End: 2022-01-01

## 2022-01-01 RX ORDER — NOREPINEPHRINE BITARTRATE/D5W 8 MG/250ML
0.1 PLASTIC BAG, INJECTION (ML) INTRAVENOUS
Qty: 16 | Refills: 0 | Status: DISCONTINUED | OUTPATIENT
Start: 2022-01-01 | End: 2022-01-01

## 2022-01-01 RX ORDER — INSULIN LISPRO 100/ML
VIAL (ML) SUBCUTANEOUS EVERY 6 HOURS
Refills: 0 | Status: DISCONTINUED | OUTPATIENT
Start: 2022-01-01 | End: 2022-01-01

## 2022-01-01 RX ORDER — REMDESIVIR 5 MG/ML
100 INJECTION INTRAVENOUS EVERY 24 HOURS
Refills: 0 | Status: COMPLETED | OUTPATIENT
Start: 2022-01-01 | End: 2022-01-01

## 2022-01-01 RX ORDER — SODIUM ZIRCONIUM CYCLOSILICATE 10 G/10G
10 POWDER, FOR SUSPENSION ORAL ONCE
Refills: 0 | Status: COMPLETED | OUTPATIENT
Start: 2022-01-01 | End: 2022-01-01

## 2022-01-01 RX ORDER — PHENYLEPHRINE HYDROCHLORIDE 10 MG/ML
0.1 INJECTION INTRAVENOUS
Qty: 160 | Refills: 0 | Status: DISCONTINUED | OUTPATIENT
Start: 2022-01-01 | End: 2022-01-01

## 2022-01-01 RX ORDER — REMDESIVIR 5 MG/ML
200 INJECTION INTRAVENOUS EVERY 24 HOURS
Refills: 0 | Status: COMPLETED | OUTPATIENT
Start: 2022-01-01 | End: 2022-01-01

## 2022-01-01 RX ORDER — DEXTROSE 50 % IN WATER 50 %
25 SYRINGE (ML) INTRAVENOUS ONCE
Refills: 0 | Status: COMPLETED | OUTPATIENT
Start: 2022-01-01 | End: 2022-01-01

## 2022-01-01 RX ORDER — REMDESIVIR 5 MG/ML
INJECTION INTRAVENOUS
Refills: 0 | Status: COMPLETED | OUTPATIENT
Start: 2022-01-01 | End: 2022-01-01

## 2022-01-01 RX ORDER — CHLORHEXIDINE GLUCONATE 213 G/1000ML
15 SOLUTION TOPICAL EVERY 12 HOURS
Refills: 0 | Status: DISCONTINUED | OUTPATIENT
Start: 2022-01-01 | End: 2022-01-01

## 2022-01-01 RX ORDER — PANTOPRAZOLE SODIUM 20 MG/1
40 TABLET, DELAYED RELEASE ORAL DAILY
Refills: 0 | Status: DISCONTINUED | OUTPATIENT
Start: 2022-01-01 | End: 2022-01-01

## 2022-01-01 RX ORDER — AZITHROMYCIN 500 MG/1
500 TABLET, FILM COATED ORAL ONCE
Refills: 0 | Status: COMPLETED | OUTPATIENT
Start: 2022-01-01 | End: 2022-01-01

## 2022-01-01 RX ADMIN — CHLORHEXIDINE GLUCONATE 15 MILLILITER(S): 213 SOLUTION TOPICAL at 17:52

## 2022-01-01 RX ADMIN — CISATRACURIUM BESYLATE 15.9 MICROGRAM(S)/KG/MIN: 2 INJECTION INTRAVENOUS at 09:39

## 2022-01-01 RX ADMIN — Medication 1: at 19:01

## 2022-01-01 RX ADMIN — Medication 1: at 11:18

## 2022-01-01 RX ADMIN — CHLORHEXIDINE GLUCONATE 1 APPLICATION(S): 213 SOLUTION TOPICAL at 05:23

## 2022-01-01 RX ADMIN — KETAMINE HYDROCHLORIDE 2.65 MG/KG/HR: 100 INJECTION INTRAMUSCULAR; INTRAVENOUS at 18:35

## 2022-01-01 RX ADMIN — PANTOPRAZOLE SODIUM 40 MILLIGRAM(S): 20 TABLET, DELAYED RELEASE ORAL at 11:35

## 2022-01-01 RX ADMIN — ENOXAPARIN SODIUM 40 MILLIGRAM(S): 100 INJECTION SUBCUTANEOUS at 11:22

## 2022-01-01 RX ADMIN — HEPARIN SODIUM 5000 UNIT(S): 5000 INJECTION INTRAVENOUS; SUBCUTANEOUS at 06:26

## 2022-01-01 RX ADMIN — FENTANYL CITRATE 17.7 MICROGRAM(S)/KG/HR: 50 INJECTION INTRAVENOUS at 00:22

## 2022-01-01 RX ADMIN — Medication 1: at 17:37

## 2022-01-01 RX ADMIN — PROPOFOL 21.2 MICROGRAM(S)/KG/MIN: 10 INJECTION, EMULSION INTRAVENOUS at 06:43

## 2022-01-01 RX ADMIN — CHLORHEXIDINE GLUCONATE 15 MILLILITER(S): 213 SOLUTION TOPICAL at 05:04

## 2022-01-01 RX ADMIN — PROPOFOL 21.2 MICROGRAM(S)/KG/MIN: 10 INJECTION, EMULSION INTRAVENOUS at 08:52

## 2022-01-01 RX ADMIN — CHLORHEXIDINE GLUCONATE 15 MILLILITER(S): 213 SOLUTION TOPICAL at 05:06

## 2022-01-01 RX ADMIN — ENOXAPARIN SODIUM 40 MILLIGRAM(S): 100 INJECTION SUBCUTANEOUS at 11:36

## 2022-01-01 RX ADMIN — HEPARIN SODIUM 5000 UNIT(S): 5000 INJECTION INTRAVENOUS; SUBCUTANEOUS at 05:03

## 2022-01-01 RX ADMIN — Medication 40 MILLIGRAM(S): at 10:30

## 2022-01-01 RX ADMIN — CHLORHEXIDINE GLUCONATE 15 MILLILITER(S): 213 SOLUTION TOPICAL at 05:02

## 2022-01-01 RX ADMIN — FENTANYL CITRATE 17.7 MICROGRAM(S)/KG/HR: 50 INJECTION INTRAVENOUS at 22:51

## 2022-01-01 RX ADMIN — KETAMINE HYDROCHLORIDE 2.65 MG/KG/HR: 100 INJECTION INTRAMUSCULAR; INTRAVENOUS at 10:12

## 2022-01-01 RX ADMIN — Medication 2: at 00:45

## 2022-01-01 RX ADMIN — PROPOFOL 21.2 MICROGRAM(S)/KG/MIN: 10 INJECTION, EMULSION INTRAVENOUS at 12:07

## 2022-01-01 RX ADMIN — FENTANYL CITRATE 17.7 MICROGRAM(S)/KG/HR: 50 INJECTION INTRAVENOUS at 11:36

## 2022-01-01 RX ADMIN — Medication 1: at 11:29

## 2022-01-01 RX ADMIN — CHLORHEXIDINE GLUCONATE 15 MILLILITER(S): 213 SOLUTION TOPICAL at 18:03

## 2022-01-01 RX ADMIN — ENOXAPARIN SODIUM 40 MILLIGRAM(S): 100 INJECTION SUBCUTANEOUS at 11:32

## 2022-01-01 RX ADMIN — Medication 8.29 MICROGRAM(S)/KG/MIN: at 06:43

## 2022-01-01 RX ADMIN — PANTOPRAZOLE SODIUM 40 MILLIGRAM(S): 20 TABLET, DELAYED RELEASE ORAL at 11:22

## 2022-01-01 RX ADMIN — HEPARIN SODIUM 5000 UNIT(S): 5000 INJECTION INTRAVENOUS; SUBCUTANEOUS at 05:04

## 2022-01-01 RX ADMIN — CHLORHEXIDINE GLUCONATE 15 MILLILITER(S): 213 SOLUTION TOPICAL at 05:48

## 2022-01-01 RX ADMIN — HEPARIN SODIUM 5000 UNIT(S): 5000 INJECTION INTRAVENOUS; SUBCUTANEOUS at 21:09

## 2022-01-01 RX ADMIN — Medication 2: at 18:12

## 2022-01-01 RX ADMIN — FENTANYL CITRATE 17.7 MICROGRAM(S)/KG/HR: 50 INJECTION INTRAVENOUS at 09:40

## 2022-01-01 RX ADMIN — CEFTRIAXONE 100 MILLIGRAM(S): 500 INJECTION, POWDER, FOR SOLUTION INTRAMUSCULAR; INTRAVENOUS at 13:40

## 2022-01-01 RX ADMIN — SODIUM CHLORIDE 80 MILLILITER(S): 9 INJECTION INTRAMUSCULAR; INTRAVENOUS; SUBCUTANEOUS at 13:12

## 2022-01-01 RX ADMIN — SODIUM CHLORIDE 2500 MILLILITER(S): 9 INJECTION INTRAMUSCULAR; INTRAVENOUS; SUBCUTANEOUS at 12:10

## 2022-01-01 RX ADMIN — Medication 2: at 11:36

## 2022-01-01 RX ADMIN — Medication 1: at 17:18

## 2022-01-01 RX ADMIN — SODIUM CHLORIDE 75 MILLILITER(S): 9 INJECTION, SOLUTION INTRAVENOUS at 05:48

## 2022-01-01 RX ADMIN — KETAMINE HYDROCHLORIDE 40 MILLIGRAM(S): 100 INJECTION INTRAMUSCULAR; INTRAVENOUS at 11:35

## 2022-01-01 RX ADMIN — SENNA PLUS 10 MILLILITER(S): 8.6 TABLET ORAL at 11:35

## 2022-01-01 RX ADMIN — KETAMINE HYDROCHLORIDE 2.65 MG/KG/HR: 100 INJECTION INTRAMUSCULAR; INTRAVENOUS at 07:41

## 2022-01-01 RX ADMIN — SODIUM CHLORIDE 75 MILLILITER(S): 9 INJECTION, SOLUTION INTRAVENOUS at 17:23

## 2022-01-01 RX ADMIN — AZITHROMYCIN 255 MILLIGRAM(S): 500 TABLET, FILM COATED ORAL at 14:05

## 2022-01-01 RX ADMIN — HEPARIN SODIUM 5000 UNIT(S): 5000 INJECTION INTRAVENOUS; SUBCUTANEOUS at 21:19

## 2022-01-01 RX ADMIN — FENTANYL CITRATE 17.7 MICROGRAM(S)/KG/HR: 50 INJECTION INTRAVENOUS at 15:46

## 2022-01-01 RX ADMIN — CHLORHEXIDINE GLUCONATE 15 MILLILITER(S): 213 SOLUTION TOPICAL at 17:01

## 2022-01-01 RX ADMIN — HEPARIN SODIUM 5000 UNIT(S): 5000 INJECTION INTRAVENOUS; SUBCUTANEOUS at 05:22

## 2022-01-01 RX ADMIN — PANTOPRAZOLE SODIUM 40 MILLIGRAM(S): 20 TABLET, DELAYED RELEASE ORAL at 13:12

## 2022-01-01 RX ADMIN — REMDESIVIR 500 MILLIGRAM(S): 5 INJECTION INTRAVENOUS at 17:46

## 2022-01-01 RX ADMIN — FENTANYL CITRATE 17.7 MICROGRAM(S)/KG/HR: 50 INJECTION INTRAVENOUS at 05:02

## 2022-01-01 RX ADMIN — PANTOPRAZOLE SODIUM 40 MILLIGRAM(S): 20 TABLET, DELAYED RELEASE ORAL at 11:29

## 2022-01-01 RX ADMIN — SENNA PLUS 10 MILLILITER(S): 8.6 TABLET ORAL at 11:24

## 2022-01-01 RX ADMIN — CHLORHEXIDINE GLUCONATE 1 APPLICATION(S): 213 SOLUTION TOPICAL at 05:49

## 2022-01-01 RX ADMIN — PROPOFOL 21.2 MICROGRAM(S)/KG/MIN: 10 INJECTION, EMULSION INTRAVENOUS at 05:07

## 2022-01-01 RX ADMIN — KETAMINE HYDROCHLORIDE 2.65 MG/KG/HR: 100 INJECTION INTRAMUSCULAR; INTRAVENOUS at 11:34

## 2022-01-01 RX ADMIN — FENTANYL CITRATE 17.7 MICROGRAM(S)/KG/HR: 50 INJECTION INTRAVENOUS at 13:25

## 2022-01-01 RX ADMIN — PROPOFOL 21.2 MICROGRAM(S)/KG/MIN: 10 INJECTION, EMULSION INTRAVENOUS at 09:38

## 2022-01-01 RX ADMIN — Medication 8.29 MICROGRAM(S)/KG/MIN: at 12:08

## 2022-01-01 RX ADMIN — HEPARIN SODIUM 5000 UNIT(S): 5000 INJECTION INTRAVENOUS; SUBCUTANEOUS at 13:12

## 2022-01-01 RX ADMIN — Medication 1: at 13:00

## 2022-01-01 RX ADMIN — PROPOFOL 21.2 MICROGRAM(S)/KG/MIN: 10 INJECTION, EMULSION INTRAVENOUS at 21:19

## 2022-01-01 RX ADMIN — REMDESIVIR 500 MILLIGRAM(S): 5 INJECTION INTRAVENOUS at 18:00

## 2022-01-01 RX ADMIN — PROPOFOL 21.2 MICROGRAM(S)/KG/MIN: 10 INJECTION, EMULSION INTRAVENOUS at 06:23

## 2022-01-01 RX ADMIN — REMDESIVIR 500 MILLIGRAM(S): 5 INJECTION INTRAVENOUS at 17:37

## 2022-01-01 RX ADMIN — HEPARIN SODIUM 5000 UNIT(S): 5000 INJECTION INTRAVENOUS; SUBCUTANEOUS at 22:51

## 2022-01-01 RX ADMIN — CHLORHEXIDINE GLUCONATE 15 MILLILITER(S): 213 SOLUTION TOPICAL at 17:19

## 2022-01-01 RX ADMIN — CHLORHEXIDINE GLUCONATE 1 APPLICATION(S): 213 SOLUTION TOPICAL at 05:03

## 2022-01-01 RX ADMIN — FENTANYL CITRATE 17.7 MICROGRAM(S)/KG/HR: 50 INJECTION INTRAVENOUS at 11:34

## 2022-01-01 RX ADMIN — KETAMINE HYDROCHLORIDE 2.65 MG/KG/HR: 100 INJECTION INTRAMUSCULAR; INTRAVENOUS at 17:46

## 2022-01-01 RX ADMIN — FENTANYL CITRATE 17.7 MICROGRAM(S)/KG/HR: 50 INJECTION INTRAVENOUS at 21:36

## 2022-01-01 RX ADMIN — SODIUM CHLORIDE 80 MILLILITER(S): 9 INJECTION INTRAMUSCULAR; INTRAVENOUS; SUBCUTANEOUS at 22:21

## 2022-01-01 RX ADMIN — FENTANYL CITRATE 17.7 MICROGRAM(S)/KG/HR: 50 INJECTION INTRAVENOUS at 14:38

## 2022-01-01 RX ADMIN — Medication 8.29 MICROGRAM(S)/KG/MIN: at 18:04

## 2022-01-01 RX ADMIN — Medication 6 MILLIGRAM(S): at 06:26

## 2022-01-01 RX ADMIN — Medication 6 MILLIGRAM(S): at 05:22

## 2022-01-01 RX ADMIN — Medication 8.29 MICROGRAM(S)/KG/MIN: at 17:53

## 2022-01-01 RX ADMIN — CHLORHEXIDINE GLUCONATE 1 APPLICATION(S): 213 SOLUTION TOPICAL at 05:02

## 2022-01-01 RX ADMIN — Medication 1: at 23:53

## 2022-01-01 RX ADMIN — CHLORHEXIDINE GLUCONATE 1 APPLICATION(S): 213 SOLUTION TOPICAL at 05:06

## 2022-01-01 RX ADMIN — Medication 6 MILLIGRAM(S): at 05:02

## 2022-01-01 RX ADMIN — Medication 2: at 06:33

## 2022-01-01 RX ADMIN — PANTOPRAZOLE SODIUM 40 MILLIGRAM(S): 20 TABLET, DELAYED RELEASE ORAL at 13:50

## 2022-01-01 RX ADMIN — Medication 1: at 05:01

## 2022-01-01 RX ADMIN — CHLORHEXIDINE GLUCONATE 15 MILLILITER(S): 213 SOLUTION TOPICAL at 17:46

## 2022-01-01 RX ADMIN — PANTOPRAZOLE SODIUM 40 MILLIGRAM(S): 20 TABLET, DELAYED RELEASE ORAL at 11:34

## 2022-01-01 RX ADMIN — PROPOFOL 21.2 MICROGRAM(S)/KG/MIN: 10 INJECTION, EMULSION INTRAVENOUS at 07:51

## 2022-01-01 RX ADMIN — PROPOFOL 21.2 MICROGRAM(S)/KG/MIN: 10 INJECTION, EMULSION INTRAVENOUS at 17:53

## 2022-01-01 RX ADMIN — CEFEPIME 100 MILLIGRAM(S): 1 INJECTION, POWDER, FOR SOLUTION INTRAMUSCULAR; INTRAVENOUS at 12:18

## 2022-01-01 RX ADMIN — PHENYLEPHRINE HYDROCHLORIDE 1.66 MICROGRAM(S)/KG/MIN: 10 INJECTION INTRAVENOUS at 15:18

## 2022-01-01 RX ADMIN — Medication 25 MILLILITER(S): at 03:14

## 2022-01-01 RX ADMIN — Medication 6 MILLIGRAM(S): at 05:07

## 2022-01-01 RX ADMIN — SODIUM ZIRCONIUM CYCLOSILICATE 10 GRAM(S): 10 POWDER, FOR SUSPENSION ORAL at 03:13

## 2022-01-01 RX ADMIN — PROPOFOL 0.53 MICROGRAM(S)/KG/MIN: 10 INJECTION, EMULSION INTRAVENOUS at 15:44

## 2022-01-01 RX ADMIN — CISATRACURIUM BESYLATE 15.9 MICROGRAM(S)/KG/MIN: 2 INJECTION INTRAVENOUS at 11:38

## 2022-01-01 RX ADMIN — KETAMINE HYDROCHLORIDE 2.65 MG/KG/HR: 100 INJECTION INTRAMUSCULAR; INTRAVENOUS at 21:48

## 2022-01-01 RX ADMIN — Medication 1: at 18:22

## 2022-01-01 RX ADMIN — INSULIN HUMAN 10 UNIT(S): 100 INJECTION, SOLUTION SUBCUTANEOUS at 03:13

## 2022-01-01 RX ADMIN — KETAMINE HYDROCHLORIDE 2.65 MG/KG/HR: 100 INJECTION INTRAMUSCULAR; INTRAVENOUS at 10:28

## 2022-01-01 RX ADMIN — Medication 8.29 MICROGRAM(S)/KG/MIN: at 09:41

## 2022-01-01 RX ADMIN — SODIUM CHLORIDE 75 MILLILITER(S): 9 INJECTION, SOLUTION INTRAVENOUS at 08:50

## 2022-01-01 RX ADMIN — Medication 200 GRAM(S): at 03:13

## 2022-01-01 RX ADMIN — PROPOFOL 21.2 MICROGRAM(S)/KG/MIN: 10 INJECTION, EMULSION INTRAVENOUS at 23:54

## 2022-01-01 RX ADMIN — HEPARIN SODIUM 5000 UNIT(S): 5000 INJECTION INTRAVENOUS; SUBCUTANEOUS at 14:05

## 2022-01-01 RX ADMIN — FENTANYL CITRATE 17.7 MICROGRAM(S)/KG/HR: 50 INJECTION INTRAVENOUS at 00:20

## 2022-01-01 RX ADMIN — CISATRACURIUM BESYLATE 15.9 MICROGRAM(S)/KG/MIN: 2 INJECTION INTRAVENOUS at 22:52

## 2022-01-01 RX ADMIN — SODIUM CHLORIDE 80 MILLILITER(S): 9 INJECTION INTRAMUSCULAR; INTRAVENOUS; SUBCUTANEOUS at 13:40

## 2022-01-01 RX ADMIN — PROPOFOL 21.2 MICROGRAM(S)/KG/MIN: 10 INJECTION, EMULSION INTRAVENOUS at 22:53

## 2022-01-01 RX ADMIN — HEPARIN SODIUM 5000 UNIT(S): 5000 INJECTION INTRAVENOUS; SUBCUTANEOUS at 22:19

## 2022-01-01 RX ADMIN — HEPARIN SODIUM 5000 UNIT(S): 5000 INJECTION INTRAVENOUS; SUBCUTANEOUS at 13:51

## 2022-01-01 RX ADMIN — PROPOFOL 21.2 MICROGRAM(S)/KG/MIN: 10 INJECTION, EMULSION INTRAVENOUS at 14:45

## 2022-01-01 RX ADMIN — PROPOFOL 21.2 MICROGRAM(S)/KG/MIN: 10 INJECTION, EMULSION INTRAVENOUS at 20:43

## 2022-01-01 RX ADMIN — HEPARIN SODIUM 5000 UNIT(S): 5000 INJECTION INTRAVENOUS; SUBCUTANEOUS at 15:44

## 2022-01-01 RX ADMIN — Medication 6 MILLIGRAM(S): at 05:04

## 2022-01-01 RX ADMIN — CHLORHEXIDINE GLUCONATE 1 APPLICATION(S): 213 SOLUTION TOPICAL at 06:26

## 2022-01-01 RX ADMIN — PROPOFOL 21.2 MICROGRAM(S)/KG/MIN: 10 INJECTION, EMULSION INTRAVENOUS at 00:46

## 2022-01-01 RX ADMIN — Medication 6 MILLIGRAM(S): at 05:49

## 2022-01-01 RX ADMIN — REMDESIVIR 500 MILLIGRAM(S): 5 INJECTION INTRAVENOUS at 16:19

## 2022-01-01 RX ADMIN — REMDESIVIR 500 MILLIGRAM(S): 5 INJECTION INTRAVENOUS at 17:02

## 2022-01-08 NOTE — ED PROVIDER NOTE - PROGRESS NOTE DETAILS
Patient plateaued at 87%NRB. Put on bipap, improved to 91%. Called ICU. Spoke to Dr. abdi who will come see patient. Patient retaining CO2. Accepted to ICU. CXR has appearance more typical of bacterial pneumonia than COVID. code sepsis called. will give abx while awaiting COVID result.

## 2022-01-08 NOTE — ED PROVIDER NOTE - OBJECTIVE STATEMENT
Patient was rapid response from mail lobby - walked in intending to come to ED for shortness of breath and cough x 8 days. No fever, cp, ap, n/v/d.

## 2022-01-08 NOTE — ED PROVIDER NOTE - CARE PLAN
1 Principal Discharge DX:	Acute respiratory failure with hypoxia and hypercapnia  Secondary Diagnosis:	Severe sepsis  Secondary Diagnosis:	Pneumonia

## 2022-01-08 NOTE — ED ADULT NURSE NOTE - OBJECTIVE STATEMENT
pt is here for shortness of breath.  pt stated that sick for 8 days, c/o shortness of breathing, O2 sat=61% RA, c/o weakness, denied chest pain, denied fever or chills,

## 2022-01-08 NOTE — H&P ADULT - HISTORY OF PRESENT ILLNESS
81M from home, AAOX3, self ambulating with PMH of DM and HLD comes to thr ED with c.o fever and cough since 8 days. Pt appears tired, but AAOX3. Obtaining history is limited as pt is on BIPAP. Pt mentions having worsening shortness of breath today and visited the ED. He was called for RRT in the main lobby for hypoxia - spo2 61% on Room air. Pt also endorses dry cough and increased generalized weakness.   Pt denies any sick contact and stays with grand child. Pt is vaccinated with J&J.     In the ED,   Pt was started on the NRB 15L, Spo2 87% ---> started on BIPAP 16/8/100   CXR RLL infiltrate-> called for code sepsis   ABG- Respiratory acidosis  81M from home, AAOX3, self ambulating with PMH of DM and HLD comes to thr ED with c.o fever and cough since 8 days. Pt appears tired, but AAOX3. Obtaining history is limited as pt is on BIPAP. Pt mentions having worsening shortness of breath today and visited the ED. He was called for RRT in the main lobby for hypoxia - spo2 61% on Room air. Pt also endorses dry cough and increased generalized weakness.   Pt denies any sick contact and stays with grand child. Pt is vaccinated with J&J.     In the ED,   Pt was started on the NRB 15L, Spo2 87% ---> started on BIPAP 16/8/100   CXR RLL infiltrate-> called for code sepsis   COVID+   ABG- Respiratory acidosis

## 2022-01-08 NOTE — H&P ADULT - ATTENDING COMMENTS
81M from home, AAOX3, self ambulating with PMH of DM and HLD comes to thr ED with c.o fever and cough since 8 days.  He was called for RRT in the main lobby for hypoxia - spo2 61% on Room air. Pt also endorses dry cough and increased generalized weakness.     In the ED,   Pt was started on the NRB 15L, Spo2 87% ---> started on BIPAP 16/8/100   CXR RLL infiltrate-> called for code sepsis   ABG- Respiratory acidosis     FULL CODE       ICU was consulted for Hypoxia 2/2 Pna       Assessment:    - Acute Hypoxic Hypercapnic resp  Respiratory Failure   - PNA b/l    - Covid-19 infection   - AMS / Encephalopathy   - DM uncontrol   - HLD   - ÁNGEL    Plan   - Admit to ICU   - No sedation   - AMS / encephalopathy due to hypercapnia   - Continue BiPaP alternate with HFNC as tolerated   - Isolation : contact and airborne   - Remdesivir daily x 5 days   - Decadron 6 mg /day x 10 day then taper  - Monitor biomarkers daily   - Monitor blood sugar with coverage   - Asp precaution   - NPO   - IVF  - Monitor renal fx   - DVT GI prophy

## 2022-01-08 NOTE — H&P ADULT - ASSESSMENT
81M from home, AAOX3, self ambulating with PMH of DM and HLD comes to thr ED with c.o fever and cough since 8 days.  He was called for RRT in the main lobby for hypoxia - spo2 61% on Room air. Pt also endorses dry cough and increased generalized weakness.     In the ED,   Pt was started on the NRB 15L, Spo2 87% ---> started on BIPAP 16/8/100   CXR RLL infiltrate-> called for code sepsis   ABG- Respiratory acidosis     FULL CODE       ICU was consulted for Hypoxia 2/2 Pna       Assessment:    Acute Hypoxic Respiratory Failure   RLL Pna   DM   HLD   ÁNGEL     PLAN:    #CNS:  Pt is mildly lethargic, AAOX3     #CVS:  EKG NSR   / 75, hr 85  Pt has h/o of HLD ( not on any meds)    #Resp:  Acute Hypoxic Respiratory Failure:  Pt noted to be hypoxic 61% on RA--> Pt was started on the NRB 15L, Spo2 87% ---> started on BIPAP 16/8/100   CXR RLL infiltrate-> called for code sepsis   ABG- 7.32/70/75/36 -  Respiratory acidosis   Pt started on Azithromycin and Ceftriaxone   f/u Covid and Inf markers  f/u Bld cx and sputum cx   Pt to be started on the HFNC in ICU   D-dimer 365     #Renal:  ÁNGEL:  Pt noted to have BUN/ Cr 36/ 1.45   eGFR- 45  Pt denies any renal disorders   Likely ÁNGEL 2/2 dehydration   f/u BMP daily   c/w mild hydration     #ENDO:  Pt has PMH of DM   ? takes Metformin 500mg BD at home, not on insulin   RBS- 198   C/W HSS   F/u A1C       #ID:  Mild Leucocytosis - 12k   Likely 2/2 pna   c/w Azithro and ceftriaxone  f/u bld cx, sputum cx     #GI-  No issues   On DASH diet   To be kept NPO when on BIPAP     #MSK/SKIN   no issues     #PPX   PPI  Heparin    GOC  FULL CODE     DISPO:  Pt to be moved to ICU for further care        81M from home, AAOX3, self ambulating with PMH of DM and HLD comes to thr ED with c.o fever and cough since 8 days.  He was called for RRT in the main lobby for hypoxia - spo2 61% on Room air. Pt also endorses dry cough and increased generalized weakness.     In the ED,   Pt was started on the NRB 15L, Spo2 87% ---> started on BIPAP 16/8/100   CXR RLL infiltrate-> called for code sepsis   ABG- Respiratory acidosis     FULL CODE       ICU was consulted for Hypoxia 2/2 Pna       Assessment:    Acute Hypoxic Respiratory Failure   RLL Pna   DM   HLD   ÁNGEL     PLAN:    #CNS:  Pt is mildly lethargic, AAOX3     #CVS  / 75, hr 85  Pt has h/o of HLD ( not on any meds)  Trops- 105, EKG- RBBB  F/U trops 2 & 3     #Resp:  Acute Hypoxic Respiratory Failure:  Pt noted to be hypoxic 61% on RA--> Pt was started on the NRB 15L, Spo2 87% ---> started on BIPAP 16/8/100   COVID+,  CXR RLL infiltrate-> called for code sepsis   ABG- 7.32/70/75/36 -  Respiratory acidosis   Pt started on Remedesevir and decadron   f/u Covid Inf markers  f/u Bld cx and sputum cx   c.w BIPAP in icu   D-dimer 365     #Renal:  ÁNGEL:  Pt noted to have BUN/ Cr 36/ 1.45   eGFR- 45  Pt denies any renal disorders   Likely ÁNGEL 2/2 dehydration   f/u BMP daily   c/w mild hydration     #ENDO:  Pt has PMH of DM   ? takes Metformin 500mg BD at home, not on insulin   RBS- 198   C/W HSS   F/u A1C       #ID:  Mild Leucocytosis - 12k   Likely 2/2 pna   c/w Azithro and ceftriaxone  f/u bld cx, sputum cx     #GI-  No issues   On DASH diet   To be kept NPO when on BIPAP     #MSK/SKIN   no issues     #PPX   PPI  Heparin    GOC  FULL CODE     DISPO:  Pt to be moved to ICU for further care

## 2022-01-08 NOTE — H&P ADULT - NSHPLABSRESULTS_GEN_ALL_CORE
16.9   12.94 )-----------( 200      ( 08 Jan 2022 11:38 )             54.5       01-08    140  |  100  |  36<H>  ----------------------------<  198<H>  4.9   |  34<H>  |  1.46<H>    Ca    8.6      08 Jan 2022 11:38    TPro  7.0  /  Alb  2.5<L>  /  TBili  1.1  /  DBili  x   /  AST  38  /  ALT  35  /  AlkPhos  61  01-08          ABG - ( 08 Jan 2022 12:24 )  pH, Arterial: 7.32  pH, Blood: x     /  pCO2: 70    /  pO2: 75    / HCO3: 36    / Base Excess: 7.4   /  SaO2: 95                          Lactate Trend            CAPILLARY BLOOD GLUCOSE

## 2022-01-09 NOTE — PATIENT PROFILE ADULT - FALL HARM RISK - HARM RISK INTERVENTIONS

## 2022-01-09 NOTE — PROGRESS NOTE ADULT - SUBJECTIVE AND OBJECTIVE BOX
INTERVAL HPI/OVERNIGHT EVENTS: ***    PRESSORS: [ ] YES [ ] NO    Antimicrobial:  remdesivir  IVPB   IV Intermittent   remdesivir  IVPB 100 milliGRAM(s) IV Intermittent every 24 hours    Cardiovascular:    Pulmonary:    Hematologic:  heparin   Injectable 5000 Unit(s) SubCutaneous every 8 hours    Other:  chlorhexidine 2% Cloths 1 Application(s) Topical <User Schedule>  dexAMETHasone  Injectable 6 milliGRAM(s) IV Push daily  insulin lispro (ADMELOG) corrective regimen sliding scale   SubCutaneous three times a day before meals  pantoprazole  Injectable 40 milliGRAM(s) IV Push daily  sodium chloride 0.9%. 1000 milliLiter(s) IV Continuous <Continuous>    chlorhexidine 2% Cloths 1 Application(s) Topical <User Schedule>  dexAMETHasone  Injectable 6 milliGRAM(s) IV Push daily  heparin   Injectable 5000 Unit(s) SubCutaneous every 8 hours  insulin lispro (ADMELOG) corrective regimen sliding scale   SubCutaneous three times a day before meals  pantoprazole  Injectable 40 milliGRAM(s) IV Push daily  remdesivir  IVPB   IV Intermittent   remdesivir  IVPB 100 milliGRAM(s) IV Intermittent every 24 hours  sodium chloride 0.9%. 1000 milliLiter(s) IV Continuous <Continuous>    Drug Dosing Weight  Height (cm): 172.7 (2022 15:30)  Weight (kg): 88.4 (2022 15:30)  BMI (kg/m2): 29.6 (2022 15:30)  BSA (m2): 2.02 (2022 15:30)    CENTRAL LINE: [ ] YES [ ] NO  LOCATION:   DATE INSERTED:  REMOVE: [ ] YES [ ] NO  EXPLAIN:    HER: [ ] YES [ ] NO    DATE INSERTED:  REMOVE:  [ ] YES [ ] NO  EXPLAIN:    A-LINE:  [ ] YES [ ] NO  LOCATION:   DATE INSERTED:  REMOVE:  [ ] YES [ ] NO  EXPLAIN:    PMH -reviewed admission note, no change since admission      ABG - ( 2022 12:24 )  pH, Arterial: 7.32  pH, Blood: x     /  pCO2: 70    /  pO2: 75    / HCO3: 36    / Base Excess: 7.4   /  SaO2: 95                            PHYSICAL EXAM:    GENERAL: NAD, well-groomed, well-developed  HEAD:  Atraumatic, Normocephalic  EYES: EOMI, PERRLA, conjunctiva and sclera clear  ENMT: No tonsillar erythema, exudates, or enlargement; Moist mucous membranes, Good dentition, [ ]No lesions  NECK: Supple, normal appearance, No JVD; Normal thyroid; Trachea midline  NERVOUS SYSTEM:  Alert & Oriented X3, Good concentration; Motor Strength 5/5 B/L upper and lower extremities; DTRs 2+ intact and symmetric  CHEST/LUNG: No chest deformity; Normal percussion bilaterally; No rales, rhonchi, wheezing   HEART: Regular rate and rhythm; No murmurs, rubs, or gallops  ABDOMEN: Soft, Nontender, Nondistended;Bowel sounds present  EXTREMITIES:  2+ Peripheral Pulses, No clubbing, cyanosis, or edema  LYMPH: No lymphadenopathy noted  SKIN: No rashes or lesions; Good capillary refill      LABS:  CBC Full  -  ( 2022 11:38 )  WBC Count : 12.94 K/uL  RBC Count : 5.60 M/uL  Hemoglobin : 16.9 g/dL  Hematocrit : 54.5 %  Platelet Count - Automated : 200 K/uL  Mean Cell Volume : 97.3 fl  Mean Cell Hemoglobin : 30.2 pg  Mean Cell Hemoglobin Concentration : 31.0 gm/dL  Auto Neutrophil # : 11.52 K/uL  Auto Lymphocyte # : 0.91 K/uL  Auto Monocyte # : 0.52 K/uL  Auto Eosinophil # : 0.00 K/uL  Auto Basophil # : 0.00 K/uL  Auto Neutrophil % : 87.0 %  Auto Lymphocyte % : 7.0 %  Auto Monocyte % : 4.0 %  Auto Eosinophil % : 0.0 %  Auto Basophil % : 0.0 %        143  |  106  |  34<H>  ----------------------------<  119<H>  4.7   |  32<H>  |  1.01    Ca    7.8<L>      2022 00:21  Phos  4.3       Mg     2.4         TPro  5.9<L>  /  Alb  2.0<L>  /  TBili  0.6  /  DBili  x   /  AST  36  /  ALT  32  /  AlkPhos  54        Urinalysis Basic - ( 2022 00:22 )    Color: Yellow / Appearance: Clear / S.025 / pH: x  Gluc: x / Ketone: Trace  / Bili: Small / Urobili: 4   Blood: x / Protein: 500 mg/dL / Nitrite: Positive   Leuk Esterase: Small / RBC: 2-5 /HPF / WBC 3-5 /HPF   Sq Epi: x / Non Sq Epi: Few /HPF / Bacteria: Moderate /HPF          RADIOLOGY & ADDITIONAL STUDIES REVIEWED:  ***    [ ]GOALS OF CARE DISCUSSION WITH PATIENT/FAMILY/PROXY:    CRITICAL CARE TIME SPENT: 35 minutes INTERVAL HPI/OVERNIGHT EVENTS: Admitted to ICU yesterday, on BiPAP since ED. Trop slight uptrend.     PRESSORS: [ ] YES [ ] NO    Antimicrobial:  remdesivir  IVPB   IV Intermittent   remdesivir  IVPB 100 milliGRAM(s) IV Intermittent every 24 hours    Cardiovascular:    Pulmonary:    Hematologic:  heparin   Injectable 5000 Unit(s) SubCutaneous every 8 hours    Other:  chlorhexidine 2% Cloths 1 Application(s) Topical <User Schedule>  dexAMETHasone  Injectable 6 milliGRAM(s) IV Push daily  insulin lispro (ADMELOG) corrective regimen sliding scale   SubCutaneous three times a day before meals  pantoprazole  Injectable 40 milliGRAM(s) IV Push daily  sodium chloride 0.9%. 1000 milliLiter(s) IV Continuous <Continuous>    chlorhexidine 2% Cloths 1 Application(s) Topical <User Schedule>  dexAMETHasone  Injectable 6 milliGRAM(s) IV Push daily  heparin   Injectable 5000 Unit(s) SubCutaneous every 8 hours  insulin lispro (ADMELOG) corrective regimen sliding scale   SubCutaneous three times a day before meals  pantoprazole  Injectable 40 milliGRAM(s) IV Push daily  remdesivir  IVPB   IV Intermittent   remdesivir  IVPB 100 milliGRAM(s) IV Intermittent every 24 hours  sodium chloride 0.9%. 1000 milliLiter(s) IV Continuous <Continuous>    Drug Dosing Weight  Height (cm): 172.7 (2022 15:30)  Weight (kg): 88.4 (2022 15:30)  BMI (kg/m2): 29.6 (2022 15:30)  BSA (m2): 2.02 (2022 15:30)    CENTRAL LINE: [ ] YES [ ] NO  LOCATION:   DATE INSERTED:  REMOVE: [ ] YES [ ] NO  EXPLAIN:    HER: [ ] YES [ ] NO    DATE INSERTED:  REMOVE:  [ ] YES [ ] NO  EXPLAIN:    A-LINE:  [ ] YES [ ] NO  LOCATION:   DATE INSERTED:  REMOVE:  [ ] YES [ ] NO  EXPLAIN:    PMH -reviewed admission note, no change since admission      ABG - ( 2022 12:24 )  pH, Arterial: 7.32  pH, Blood: x     /  pCO2: 70    /  pO2: 75    / HCO3: 36    / Base Excess: 7.4   /  SaO2: 95        PHYSICAL EXAM:    GENERAL: NAD, well-groomed, well-developed  HEAD:  Atraumatic, Normocephalic  EYES: EOMI, PERRLA, conjunctiva and sclera clear  ENMT: No tonsillar erythema, exudates, or enlargement; Moist mucous membranes, Good dentition, [ ]No lesions  NECK: Supple, normal appearance, No JVD; Normal thyroid; Trachea midline  NERVOUS SYSTEM:  Alert & Oriented X3, Good concentration; Motor Strength 5/5 B/L upper and lower extremities; DTRs 2+ intact and symmetric  CHEST/LUNG: No chest deformity; Normal percussion bilaterally; No rales, rhonchi, wheezing   HEART: Regular rate and rhythm; No murmurs, rubs, or gallops  ABDOMEN: Soft, Nontender, Nondistended;Bowel sounds present  EXTREMITIES:  2+ Peripheral Pulses, No clubbing, cyanosis, or edema  LYMPH: No lymphadenopathy noted  SKIN: No rashes or lesions; Good capillary refill      LABS:  CBC Full  -  ( 2022 11:38 )  WBC Count : 12.94 K/uL  RBC Count : 5.60 M/uL  Hemoglobin : 16.9 g/dL  Hematocrit : 54.5 %  Platelet Count - Automated : 200 K/uL  Mean Cell Volume : 97.3 fl  Mean Cell Hemoglobin : 30.2 pg  Mean Cell Hemoglobin Concentration : 31.0 gm/dL  Auto Neutrophil # : 11.52 K/uL  Auto Lymphocyte # : 0.91 K/uL  Auto Monocyte # : 0.52 K/uL  Auto Eosinophil # : 0.00 K/uL  Auto Basophil # : 0.00 K/uL  Auto Neutrophil % : 87.0 %  Auto Lymphocyte % : 7.0 %  Auto Monocyte % : 4.0 %  Auto Eosinophil % : 0.0 %  Auto Basophil % : 0.0 %        143  |  106  |  34<H>  ----------------------------<  119<H>  4.7   |  32<H>  |  1.01    Ca    7.8<L>      2022 00:21  Phos  4.3       Mg     2.4         TPro  5.9<L>  /  Alb  2.0<L>  /  TBili  0.6  /  DBili  x   /  AST  36  /  ALT  32  /  AlkPhos  54        Urinalysis Basic - ( 2022 00:22 )    Color: Yellow / Appearance: Clear / S.025 / pH: x  Gluc: x / Ketone: Trace  / Bili: Small / Urobili: 4   Blood: x / Protein: 500 mg/dL / Nitrite: Positive   Leuk Esterase: Small / RBC: 2-5 /HPF / WBC 3-5 /HPF   Sq Epi: x / Non Sq Epi: Few /HPF / Bacteria: Moderate /HPF          RADIOLOGY & ADDITIONAL STUDIES REVIEWED:  ***    [ ]GOALS OF CARE DISCUSSION WITH PATIENT/FAMILY/PROXY:    CRITICAL CARE TIME SPENT: 35 minutes INTERVAL HPI/OVERNIGHT EVENTS: Admitted to ICU yesterday, on BiPAP since admission. Trop slight uptrend. Patient saturating well on IPAP 18, EPAP 8.    PRESSORS: [ ] YES [ ] NO    Antimicrobial:  remdesivir  IVPB   IV Intermittent   remdesivir  IVPB 100 milliGRAM(s) IV Intermittent every 24 hours    Cardiovascular:    Pulmonary:    Hematologic:  heparin   Injectable 5000 Unit(s) SubCutaneous every 8 hours    Other:  chlorhexidine 2% Cloths 1 Application(s) Topical <User Schedule>  dexAMETHasone  Injectable 6 milliGRAM(s) IV Push daily  insulin lispro (ADMELOG) corrective regimen sliding scale   SubCutaneous three times a day before meals  pantoprazole  Injectable 40 milliGRAM(s) IV Push daily  sodium chloride 0.9%. 1000 milliLiter(s) IV Continuous <Continuous>    chlorhexidine 2% Cloths 1 Application(s) Topical <User Schedule>  dexAMETHasone  Injectable 6 milliGRAM(s) IV Push daily  heparin   Injectable 5000 Unit(s) SubCutaneous every 8 hours  insulin lispro (ADMELOG) corrective regimen sliding scale   SubCutaneous three times a day before meals  pantoprazole  Injectable 40 milliGRAM(s) IV Push daily  remdesivir  IVPB   IV Intermittent   remdesivir  IVPB 100 milliGRAM(s) IV Intermittent every 24 hours  sodium chloride 0.9%. 1000 milliLiter(s) IV Continuous <Continuous>    Drug Dosing Weight  Height (cm): 172.7 (2022 15:30)  Weight (kg): 88.4 (2022 15:30)  BMI (kg/m2): 29.6 (2022 15:30)  BSA (m2): 2.02 (2022 15:30)    CENTRAL LINE: [ ] YES [ ] NO  LOCATION:   DATE INSERTED:  REMOVE: [ ] YES [ ] NO  EXPLAIN:    HER: [ ] YES [ ] NO    DATE INSERTED:  REMOVE:  [ ] YES [ ] NO  EXPLAIN:    A-LINE:  [ ] YES [ ] NO  LOCATION:   DATE INSERTED:  REMOVE:  [ ] YES [ ] NO  EXPLAIN:    PMH -reviewed admission note, no change since admission      ABG - ( 2022 12:24 )  pH, Arterial: 7.32  pH, Blood: x     /  pCO2: 70    /  pO2: 75    / HCO3: 36    / Base Excess: 7.4   /  SaO2: 95        PHYSICAL EXAM:    GENERAL: lethargic  HEAD:  Atraumatic, Normocephalic  EYES: EOMI, PERRLA, conjunctiva and sclera clear  ENMT: poor dental hygiene  NECK: Supple, normal appearance, No JVD; Normal thyroid; Trachea midline  NERVOUS SYSTEM:  Alert & Oriented X3, Good concentration; Motor Strength 5/5 B/L upper and lower extremities; DTRs 2+ intact and symmetric  CHEST/LUNG: bibasilar rales R>L  HEART: Regular rate and rhythm; No murmurs, rubs, or gallops  ABDOMEN: Soft, Nontender, Nondistended;Bowel sounds present  EXTREMITIES:  2+ Peripheral Pulses, No clubbing, cyanosis, or edema  LYMPH: No lymphadenopathy noted  SKIN: No rashes or lesions; Good capillary refill      LABS:  CBC Full  -  ( 2022 11:38 )  WBC Count : 12.94 K/uL  RBC Count : 5.60 M/uL  Hemoglobin : 16.9 g/dL  Hematocrit : 54.5 %  Platelet Count - Automated : 200 K/uL  Mean Cell Volume : 97.3 fl  Mean Cell Hemoglobin : 30.2 pg  Mean Cell Hemoglobin Concentration : 31.0 gm/dL  Auto Neutrophil # : 11.52 K/uL  Auto Lymphocyte # : 0.91 K/uL  Auto Monocyte # : 0.52 K/uL  Auto Eosinophil # : 0.00 K/uL  Auto Basophil # : 0.00 K/uL  Auto Neutrophil % : 87.0 %  Auto Lymphocyte % : 7.0 %  Auto Monocyte % : 4.0 %  Auto Eosinophil % : 0.0 %  Auto Basophil % : 0.0 %        143  |  106  |  34<H>  ----------------------------<  119<H>  4.7   |  32<H>  |  1.01    Ca    7.8<L>      2022 00:21  Phos  4.3       Mg     2.4         TPro  5.9<L>  /  Alb  2.0<L>  /  TBili  0.6  /  DBili  x   /  AST  36  /  ALT  32  /  AlkPhos  54        Urinalysis Basic - ( 2022 00:22 )    Color: Yellow / Appearance: Clear / S.025 / pH: x  Gluc: x / Ketone: Trace  / Bili: Small / Urobili: 4   Blood: x / Protein: 500 mg/dL / Nitrite: Positive   Leuk Esterase: Small / RBC: 2-5 /HPF / WBC 3-5 /HPF   Sq Epi: x / Non Sq Epi: Few /HPF / Bacteria: Moderate /HPF          RADIOLOGY & ADDITIONAL STUDIES REVIEWED:  ***    [ ]GOALS OF CARE DISCUSSION WITH PATIENT/FAMILY/PROXY:    CRITICAL CARE TIME SPENT: 35 minutes INTERVAL HPI/OVERNIGHT EVENTS: Admitted to ICU yesterday, on BiPAP since admission. Trop slight uptrend. Patient saturating well on IPAP 18, EPAP 8.    PRESSORS: [ ] YES [ ] NO    Antimicrobial:  remdesivir  IVPB   IV Intermittent   remdesivir  IVPB 100 milliGRAM(s) IV Intermittent every 24 hours    Cardiovascular:    Pulmonary:    Hematologic:  heparin   Injectable 5000 Unit(s) SubCutaneous every 8 hours    Other:  chlorhexidine 2% Cloths 1 Application(s) Topical <User Schedule>  dexAMETHasone  Injectable 6 milliGRAM(s) IV Push daily  insulin lispro (ADMELOG) corrective regimen sliding scale   SubCutaneous three times a day before meals  pantoprazole  Injectable 40 milliGRAM(s) IV Push daily  sodium chloride 0.9%. 1000 milliLiter(s) IV Continuous <Continuous>    chlorhexidine 2% Cloths 1 Application(s) Topical <User Schedule>  dexAMETHasone  Injectable 6 milliGRAM(s) IV Push daily  heparin   Injectable 5000 Unit(s) SubCutaneous every 8 hours  insulin lispro (ADMELOG) corrective regimen sliding scale   SubCutaneous three times a day before meals  pantoprazole  Injectable 40 milliGRAM(s) IV Push daily  remdesivir  IVPB   IV Intermittent   remdesivir  IVPB 100 milliGRAM(s) IV Intermittent every 24 hours  sodium chloride 0.9%. 1000 milliLiter(s) IV Continuous <Continuous>    Drug Dosing Weight  Height (cm): 172.7 (2022 15:30)  Weight (kg): 88.4 (2022 15:30)  BMI (kg/m2): 29.6 (2022 15:30)  BSA (m2): 2.02 (2022 15:30)    CENTRAL LINE: [ ] YES [ ] NO  LOCATION:   DATE INSERTED:  REMOVE: [ ] YES [ ] NO  EXPLAIN:    HER: [ ] YES [ ] NO    DATE INSERTED:  REMOVE:  [ ] YES [ ] NO  EXPLAIN:    A-LINE:  [ ] YES [ ] NO  LOCATION:   DATE INSERTED:  REMOVE:  [ ] YES [ ] NO  EXPLAIN:    PMH -reviewed admission note, no change since admission      ABG - ( 2022 12:24 )  pH, Arterial: 7.32  pH, Blood: x     /  pCO2: 70    /  pO2: 75    / HCO3: 36    / Base Excess: 7.4   /  SaO2: 95        PHYSICAL EXAM:    GENERAL: lethargic  HEAD:  Atraumatic, Normocephalic  EYES: EOMI, PERRLA, conjunctiva and sclera clear  ENMT: poor dental hygiene  NECK: Supple, normal appearance, No JVD; Normal thyroid; Trachea midline  NERVOUS SYSTEM:  arousable   HEART: Regular rate and rhythm; No murmurs, rubs, or gallops  ABDOMEN: Soft, Nontender, Nondistended;Bowel sounds present  EXTREMITIES:  2+ Peripheral Pulses, No clubbing, cyanosis, or edema  LYMPH: No lymphadenopathy noted  SKIN: No rashes or lesions; Good capillary refill      LABS:  CBC Full  -  ( 2022 11:38 )  WBC Count : 12.94 K/uL  RBC Count : 5.60 M/uL  Hemoglobin : 16.9 g/dL  Hematocrit : 54.5 %  Platelet Count - Automated : 200 K/uL  Mean Cell Volume : 97.3 fl  Mean Cell Hemoglobin : 30.2 pg  Mean Cell Hemoglobin Concentration : 31.0 gm/dL  Auto Neutrophil # : 11.52 K/uL  Auto Lymphocyte # : 0.91 K/uL  Auto Monocyte # : 0.52 K/uL  Auto Eosinophil # : 0.00 K/uL  Auto Basophil # : 0.00 K/uL  Auto Neutrophil % : 87.0 %  Auto Lymphocyte % : 7.0 %  Auto Monocyte % : 4.0 %  Auto Eosinophil % : 0.0 %  Auto Basophil % : 0.0 %        143  |  106  |  34<H>  ----------------------------<  119<H>  4.7   |  32<H>  |  1.01    Ca    7.8<L>      2022 00:21  Phos  4.3       Mg     2.4         TPro  5.9<L>  /  Alb  2.0<L>  /  TBili  0.6  /  DBili  x   /  AST  36  /  ALT  32  /  AlkPhos  54        Urinalysis Basic - ( 2022 00:22 )    Color: Yellow / Appearance: Clear / S.025 / pH: x  Gluc: x / Ketone: Trace  / Bili: Small / Urobili: 4   Blood: x / Protein: 500 mg/dL / Nitrite: Positive   Leuk Esterase: Small / RBC: 2-5 /HPF / WBC 3-5 /HPF   Sq Epi: x / Non Sq Epi: Few /HPF / Bacteria: Moderate /HPF          RADIOLOGY & ADDITIONAL STUDIES REVIEWED:  ***    CXR:< from: Xray Chest 1 View- PORTABLE-Urgent (22 @ 11:57) >    ACC: [id number] EXAM:  XR CHEST PORTABLE URGENT 1V                          PROCEDURE DATE:  2022          INTERPRETATION:  Chest radiograph (one view)     CPT 59126    CLINICAL INFORMATION:  Cough. Fever.  Short of breath.    TECHNIQUE:  Single frontal view of the chest was obtained.    FINDINGS:  No previous examinations are available for review.    The lungs demonstrate bilateral lower lobe patchy airspace opacities,   right greater than left suspicious for multifocal pneumonia. The heart is   difficult to evaluate.      IMPRESSION: Bilateral lower lobe patchy airspace opacities, right greater   than left suspicious for multifocal pneumonia. Consider atypical viral   etiology.    --- End of Report ---            LOLITA BOLTON MD; Attending Radiologist  This document has been electronically signed. 2022 12:56PM    < end of copied text >    [ ]GOALS OF CARE DISCUSSION WITH PATIENT/FAMILY/PROXY:    CRITICAL CARE TIME SPENT: 35 minutes

## 2022-01-09 NOTE — PROGRESS NOTE ADULT - ASSESSMENT
81M from home, AAOX3, self ambulating with PMH of DM and HLD comes to thr ED with c.o fever and cough since 8 days.  He was called for RRT in the main lobby for hypoxia - spo2 61% on Room air. Pt also endorses dry cough and increased generalized weakness.     In the ED,   Pt was started on the NRB 15L, Spo2 87% ---> started on BIPAP 16/8/100   CXR RLL infiltrate-> called for code sepsis   ABG- Respiratory acidosis     FULL CODE       ICU was consulted for Hypoxia 2/2 Pna       Assessment:    Acute Hypoxic Respiratory Failure   RLL Pna   DM   HLD   ÁNGEL     PLAN:    #CNS:  Pt is mildly lethargic, AAOX3     #CVS  / 75, hr 85  Pt has h/o of HLD ( not on any meds)  Trops- 105, EKG- RBBB  F/U trops 2 & 3     #Resp:  Acute Hypoxic Respiratory Failure:  Pt noted to be hypoxic 61% on RA--> Pt was started on the NRB 15L, Spo2 87% ---> started on BIPAP 16/8/100   COVID+,  CXR RLL infiltrate-> called for code sepsis   ABG- 7.32/70/75/36 -  Respiratory acidosis   Pt started on Remedesevir and decadron   f/u Covid Inf markers  f/u Bld cx and sputum cx   c.w BIPAP in icu   D-dimer 365     #Renal:  ÁNGEL:  Pt noted to have BUN/ Cr 36/ 1.45   eGFR- 45  Pt denies any renal disorders   Likely ÁNGEL 2/2 dehydration   f/u BMP daily   c/w mild hydration     #ENDO:  Pt has PMH of DM   ? takes Metformin 500mg BD at home, not on insulin   RBS- 198   C/W HSS   F/u A1C       #ID:  Mild Leucocytosis - 12k   Likely 2/2 pna   c/w Azithro and ceftriaxone  f/u bld cx, sputum cx     #GI-  No issues   On DASH diet   To be kept NPO when on BIPAP     #MSK/SKIN   no issues     #PPX   PPI  Heparin    GOC  FULL CODE     DISPO:  Pt to be moved to ICU for further care

## 2022-01-10 NOTE — DIETITIAN INITIAL EVALUATION ADULT. - PERTINENT MEDS FT
MEDICATIONS  (STANDING):  chlorhexidine 0.12% Liquid 15 milliLiter(s) Oral Mucosa every 12 hours  chlorhexidine 2% Cloths 1 Application(s) Topical <User Schedule>  cisatracurium Infusion 3 MICROgram(s)/kG/Min (15.9 mL/Hr) IV Continuous <Continuous>  dexAMETHasone  Injectable 6 milliGRAM(s) IV Push daily  fentaNYL   Infusion. 2 MICROgram(s)/kG/Hr (17.7 mL/Hr) IV Continuous <Continuous>  heparin   Injectable 5000 Unit(s) SubCutaneous every 8 hours  insulin lispro (ADMELOG) corrective regimen sliding scale   SubCutaneous three times a day before meals  norepinephrine Infusion 0.05 MICROgram(s)/kG/Min (8.29 mL/Hr) IV Continuous <Continuous>  pantoprazole  Injectable 40 milliGRAM(s) IV Push daily  propofol Infusion 1 MICROgram(s)/kG/Min (0.53 mL/Hr) IV Continuous <Continuous>  remdesivir  IVPB   IV Intermittent   remdesivir  IVPB 100 milliGRAM(s) IV Intermittent every 24 hours    MEDICATIONS  (PRN):

## 2022-01-10 NOTE — PROGRESS NOTE ADULT - SUBJECTIVE AND OBJECTIVE BOX
INTERVAL HPI/OVERNIGHT EVENTS: ***    PRESSORS: [ ] YES [ ] NO    Antimicrobial:  remdesivir  IVPB   IV Intermittent   remdesivir  IVPB 100 milliGRAM(s) IV Intermittent every 24 hours    Cardiovascular:    Pulmonary:    Hematalogic:  heparin   Injectable 5000 Unit(s) SubCutaneous every 8 hours    Other:  chlorhexidine 2% Cloths 1 Application(s) Topical <User Schedule>  dexAMETHasone  Injectable 6 milliGRAM(s) IV Push daily  insulin lispro (ADMELOG) corrective regimen sliding scale   SubCutaneous three times a day before meals  pantoprazole  Injectable 40 milliGRAM(s) IV Push daily    chlorhexidine 2% Cloths 1 Application(s) Topical <User Schedule>  dexAMETHasone  Injectable 6 milliGRAM(s) IV Push daily  heparin   Injectable 5000 Unit(s) SubCutaneous every 8 hours  insulin lispro (ADMELOG) corrective regimen sliding scale   SubCutaneous three times a day before meals  pantoprazole  Injectable 40 milliGRAM(s) IV Push daily  remdesivir  IVPB   IV Intermittent   remdesivir  IVPB 100 milliGRAM(s) IV Intermittent every 24 hours    Drug Dosing Weight  Height (cm): 172.7 (2022 15:30)  Weight (kg): 88.4 (2022 15:30)  BMI (kg/m2): 29.6 (2022 15:30)  BSA (m2): 2.02 (2022 15:30)    CENTRAL LINE: [ ] YES [ ] NO  LOCATION:   DATE INSERTED:  REMOVE: [ ] YES [ ] NO  EXPLAIN:    HER: [ ] YES [ ] NO    DATE INSERTED:  REMOVE:  [ ] YES [ ] NO  EXPLAIN:    A-LINE:  [ ] YES [ ] NO  LOCATION:   DATE INSERTED:  REMOVE:  [ ] YES [ ] NO  EXPLAIN:    PMH -reviewed admission note, no change since admission      ABG - ( 2022 21:59 )  pH, Arterial: 7.54  pH, Blood: x     /  pCO2: 36    /  pO2: 112   / HCO3: 31    / Base Excess: 8.0   /  SaO2: 99                     @ :01  -  01-10 @ 07:00  --------------------------------------------------------  IN: 1850 mL / OUT: 1700 mL / NET: 150 mL            PHYSICAL EXAM:    GENERAL: NAD, well-groomed, well-developed  HEAD:  Atraumatic, Normocephalic  EYES: EOMI, PERRLA, conjunctiva and sclera clear  ENMT: No tonsillar erythema, exudates, or enlargement; Moist mucous membranes, Good dentition, [ ]No lesions  NECK: Supple, normal appearance, No JVD; Normal thyroid; Trachea midline  NERVOUS SYSTEM:  Alert & Oriented X3, Good concentration; Motor Strength 5/5 B/L upper and lower extremities; DTRs 2+ intact and symmetric  CHEST/LUNG: No chest deformity; Normal percussion bilaterally; No rales, rhonchi, wheezing   HEART: Regular rate and rhythm; No murmurs, rubs, or gallops  ABDOMEN: Soft, Nontender, Nondistended;Bowel sounds present  EXTREMITIES:  2+ Peripheral Pulses, No clubbing, cyanosis, or edema  LYMPH: No lymphadenopathy noted  SKIN: No rashes or lesions; Good capillary refill      LABS:  CBC Full  -  ( 10 William 2022 05:15 )  WBC Count : 8.81 K/uL  RBC Count : 5.24 M/uL  Hemoglobin : 15.8 g/dL  Hematocrit : 52.2 %  Platelet Count - Automated : 199 K/uL  Mean Cell Volume : 99.6 fl  Mean Cell Hemoglobin : 30.2 pg  Mean Cell Hemoglobin Concentration : 30.3 gm/dL  Auto Neutrophil # : 7.57 K/uL  Auto Lymphocyte # : 0.71 K/uL  Auto Monocyte # : 0.47 K/uL  Auto Eosinophil # : 0.00 K/uL  Auto Basophil # : 0.01 K/uL  Auto Neutrophil % : 85.9 %  Auto Lymphocyte % : 8.1 %  Auto Monocyte % : 5.3 %  Auto Eosinophil % : 0.0 %  Auto Basophil % : 0.1 %    01-10    147<H>  |  111<H>  |  33<H>  ----------------------------<  120<H>  4.9   |  30  |  0.84    Ca    8.1<L>      10 William 2022 05:15  Phos  3.0     01-10  Mg     2.8     01-10    TPro  5.8<L>  /  Alb  2.1<L>  /  TBili  0.8  /  DBili  x   /  AST  35  /  ALT  27  /  AlkPhos  58  01-10      Urinalysis Basic - ( 2022 00:22 )    Color: Yellow / Appearance: Clear / S.025 / pH: x  Gluc: x / Ketone: Trace  / Bili: Small / Urobili: 4   Blood: x / Protein: 500 mg/dL / Nitrite: Positive   Leuk Esterase: Small / RBC: 2-5 /HPF / WBC 3-5 /HPF   Sq Epi: x / Non Sq Epi: Few /HPF / Bacteria: Moderate /HPF      Culture Results:   No growth to date. ( @ 18:49)  Culture Results:   No growth to date. ( @ 18:49)      RADIOLOGY & ADDITIONAL STUDIES REVIEWED:  ***    [ ]GOALS OF CARE DISCUSSION WITH PATIENT/FAMILY/PROXY:    CRITICAL CARE TIME SPENT: 35 minutes INTERVAL HPI/OVERNIGHT EVENTS: Pt reamined on bipap overnight and was intubated in the morning as he continued to desaturate.     PRESSORS: [ ] YES [X] NO    Antimicrobial:  remdesivir  IVPB   IV Intermittent   remdesivir  IVPB 100 milliGRAM(s) IV Intermittent every 24 hours    Cardiovascular:    Pulmonary:    Hematalogic:  heparin   Injectable 5000 Unit(s) SubCutaneous every 8 hours    Other:  chlorhexidine 2% Cloths 1 Application(s) Topical <User Schedule>  dexAMETHasone  Injectable 6 milliGRAM(s) IV Push daily  insulin lispro (ADMELOG) corrective regimen sliding scale   SubCutaneous three times a day before meals  pantoprazole  Injectable 40 milliGRAM(s) IV Push daily    chlorhexidine 2% Cloths 1 Application(s) Topical <User Schedule>  dexAMETHasone  Injectable 6 milliGRAM(s) IV Push daily  heparin   Injectable 5000 Unit(s) SubCutaneous every 8 hours  insulin lispro (ADMELOG) corrective regimen sliding scale   SubCutaneous three times a day before meals  pantoprazole  Injectable 40 milliGRAM(s) IV Push daily  remdesivir  IVPB   IV Intermittent   remdesivir  IVPB 100 milliGRAM(s) IV Intermittent every 24 hours    Drug Dosing Weight  Height (cm): 172.7 (2022 15:30)  Weight (kg): 88.4 (2022 15:30)  BMI (kg/m2): 29.6 (2022 15:30)  BSA (m2): 2.02 (2022 15:30)    CENTRAL LINE: [ ] YES [X] NO  LOCATION:   DATE INSERTED:  REMOVE: [ ] YES [ ] NO  EXPLAIN:    HER: [ ] YES [X] NO    DATE INSERTED:  REMOVE:  [ ] YES [ ] NO  EXPLAIN:    A-LINE:  [ ] YES [X] NO  LOCATION:   DATE INSERTED:  REMOVE:  [ ] YES [ ] NO  EXPLAIN:    PMH -reviewed admission note, no change since admission      ABG - ( 2022 21:59 )  pH, Arterial: 7.54  pH, Blood: x     /  pCO2: 36    /  pO2: 112   / HCO3: 31    / Base Excess: 8.0   /  SaO2: 99                     @ 07:01  -  -10 @ 07:00  --------------------------------------------------------  IN: 1850 mL / OUT: 1700 mL / NET: 150 mL            PHYSICAL EXAM:    GENERAL: NAD, well-groomed, well-developed  HEAD:  Atraumatic, Normocephalic  EYES: EOMI, PERRLA, conjunctiva and sclera clear  ENMT: No tonsillar erythema, exudates, or enlargement; Moist mucous membranes, Good dentition, [ ]No lesions  NECK: Supple, normal appearance, No JVD; Normal thyroid; Trachea midline  NERVOUS SYSTEM:  Alert & Oriented X3, Good concentration; Motor Strength 5/5 B/L upper and lower extremities; DTRs 2+ intact and symmetric  CHEST/LUNG: No chest deformity; Normal percussion bilaterally; No rales, rhonchi, wheezing   HEART: Regular rate and rhythm; No murmurs, rubs, or gallops  ABDOMEN: Soft, Nontender, Nondistended;Bowel sounds present  EXTREMITIES:  2+ Peripheral Pulses, No clubbing, cyanosis, or edema  LYMPH: No lymphadenopathy noted  SKIN: No rashes or lesions; Good capillary refill      LABS:  CBC Full  -  ( 10 William 2022 05:15 )  WBC Count : 8.81 K/uL  RBC Count : 5.24 M/uL  Hemoglobin : 15.8 g/dL  Hematocrit : 52.2 %  Platelet Count - Automated : 199 K/uL  Mean Cell Volume : 99.6 fl  Mean Cell Hemoglobin : 30.2 pg  Mean Cell Hemoglobin Concentration : 30.3 gm/dL  Auto Neutrophil # : 7.57 K/uL  Auto Lymphocyte # : 0.71 K/uL  Auto Monocyte # : 0.47 K/uL  Auto Eosinophil # : 0.00 K/uL  Auto Basophil # : 0.01 K/uL  Auto Neutrophil % : 85.9 %  Auto Lymphocyte % : 8.1 %  Auto Monocyte % : 5.3 %  Auto Eosinophil % : 0.0 %  Auto Basophil % : 0.1 %    01-10    147<H>  |  111<H>  |  33<H>  ----------------------------<  120<H>  4.9   |  30  |  0.84    Ca    8.1<L>      10 William 2022 05:15  Phos  3.0     01-10  Mg     2.8     01-10    TPro  5.8<L>  /  Alb  2.1<L>  /  TBili  0.8  /  DBili  x   /  AST  35  /  ALT  27  /  AlkPhos  58  01-10      Urinalysis Basic - ( 2022 00:22 )    Color: Yellow / Appearance: Clear / S.025 / pH: x  Gluc: x / Ketone: Trace  / Bili: Small / Urobili: 4   Blood: x / Protein: 500 mg/dL / Nitrite: Positive   Leuk Esterase: Small / RBC: 2-5 /HPF / WBC 3-5 /HPF   Sq Epi: x / Non Sq Epi: Few /HPF / Bacteria: Moderate /HPF      Culture Results:   No growth to date. ( @ 18:49)  Culture Results:   No growth to date. ( @ 18:49)      RADIOLOGY & ADDITIONAL STUDIES REVIEWED:  ***    [ ]GOALS OF CARE DISCUSSION WITH PATIENT/FAMILY/PROXY:    CRITICAL CARE TIME SPENT: 35 minutes INTERVAL HPI/OVERNIGHT EVENTS: Pt reamined on bipap overnight and was intubated in the morning as he continued to desaturate.     PRESSORS: [ ] YES [X] NO    Antimicrobial:  remdesivir  IVPB   IV Intermittent   remdesivir  IVPB 100 milliGRAM(s) IV Intermittent every 24 hours    Cardiovascular:    Pulmonary:    Hematalogic:  heparin   Injectable 5000 Unit(s) SubCutaneous every 8 hours    Other:  chlorhexidine 2% Cloths 1 Application(s) Topical <User Schedule>  dexAMETHasone  Injectable 6 milliGRAM(s) IV Push daily  insulin lispro (ADMELOG) corrective regimen sliding scale   SubCutaneous three times a day before meals  pantoprazole  Injectable 40 milliGRAM(s) IV Push daily    chlorhexidine 2% Cloths 1 Application(s) Topical <User Schedule>  dexAMETHasone  Injectable 6 milliGRAM(s) IV Push daily  heparin   Injectable 5000 Unit(s) SubCutaneous every 8 hours  insulin lispro (ADMELOG) corrective regimen sliding scale   SubCutaneous three times a day before meals  pantoprazole  Injectable 40 milliGRAM(s) IV Push daily  remdesivir  IVPB   IV Intermittent   remdesivir  IVPB 100 milliGRAM(s) IV Intermittent every 24 hours    Drug Dosing Weight  Height (cm): 172.7 (2022 15:30)  Weight (kg): 88.4 (2022 15:30)  BMI (kg/m2): 29.6 (2022 15:30)  BSA (m2): 2.02 (2022 15:30)    CENTRAL LINE: [ ] YES [X] NO  LOCATION:   DATE INSERTED:  REMOVE: [ ] YES [ ] NO  EXPLAIN:    HER: [ ] YES [X] NO    DATE INSERTED:  REMOVE:  [ ] YES [ ] NO  EXPLAIN:    A-LINE:  [ ] YES [X] NO  LOCATION:   DATE INSERTED:  REMOVE:  [ ] YES [ ] NO  EXPLAIN:    PMH -reviewed admission note, no change since admission      ABG - ( 2022 21:59 )  pH, Arterial: 7.54  pH, Blood: x     /  pCO2: 36    /  pO2: 112   / HCO3: 31    / Base Excess: 8.0   /  SaO2: 99                     @ 07:01  -  -10 @ 07:00  --------------------------------------------------------  IN: 1850 mL / OUT: 1700 mL / NET: 150 mL            PHYSICAL EXAM:    GENERAL: Intubated and sedated.   HEAD:  Atraumatic, Normocephalic  EYES: EOMI, PERRLA, conjunctiva and sclera clear  ENMT: No tonsillar erythema, exudates, or enlargement; Moist mucous membranes, Good dentition, [ ]No lesions  NECK: Supple, normal appearance, No JVD; Normal thyroid; Trachea midline  NERVOUS SYSTEM:  Alert & Oriented X3, Good concentration; Motor Strength 5/5 B/L upper and lower extremities; DTRs 2+ intact and symmetric  CHEST/LUNG: Crackles b/l   HEART: Regular rate and rhythm; No murmurs, rubs, or gallops  ABDOMEN: Soft, Nontender, Nondistended;Bowel sounds present  EXTREMITIES:  2+ Peripheral Pulses, No clubbing, cyanosis, or edema  LYMPH: No lymphadenopathy noted  SKIN: No rashes or lesions; Good capillary refill      LABS:  CBC Full  -  ( 10 William 2022 05:15 )  WBC Count : 8.81 K/uL  RBC Count : 5.24 M/uL  Hemoglobin : 15.8 g/dL  Hematocrit : 52.2 %  Platelet Count - Automated : 199 K/uL  Mean Cell Volume : 99.6 fl  Mean Cell Hemoglobin : 30.2 pg  Mean Cell Hemoglobin Concentration : 30.3 gm/dL  Auto Neutrophil # : 7.57 K/uL  Auto Lymphocyte # : 0.71 K/uL  Auto Monocyte # : 0.47 K/uL  Auto Eosinophil # : 0.00 K/uL  Auto Basophil # : 0.01 K/uL  Auto Neutrophil % : 85.9 %  Auto Lymphocyte % : 8.1 %  Auto Monocyte % : 5.3 %  Auto Eosinophil % : 0.0 %  Auto Basophil % : 0.1 %    -10    147<H>  |  111<H>  |  33<H>  ----------------------------<  120<H>  4.9   |  30  |  0.84    Ca    8.1<L>      10 William 2022 05:15  Phos  3.0     01-10  Mg     2.8     -10    TPro  5.8<L>  /  Alb  2.1<L>  /  TBili  0.8  /  DBili  x   /  AST  35  /  ALT  27  /  AlkPhos  58  01-10      Urinalysis Basic - ( 2022 00:22 )    Color: Yellow / Appearance: Clear / S.025 / pH: x  Gluc: x / Ketone: Trace  / Bili: Small / Urobili: 4   Blood: x / Protein: 500 mg/dL / Nitrite: Positive   Leuk Esterase: Small / RBC: 2-5 /HPF / WBC 3-5 /HPF   Sq Epi: x / Non Sq Epi: Few /HPF / Bacteria: Moderate /HPF      Culture Results:   No growth to date. ( @ 18:49)  Culture Results:   No growth to date. ( @ 18:49)      RADIOLOGY & ADDITIONAL STUDIES REVIEWED:  ***    [ ]GOALS OF CARE DISCUSSION WITH PATIENT/FAMILY/PROXY:    CRITICAL CARE TIME SPENT: 35 minutes

## 2022-01-10 NOTE — DIETITIAN INITIAL EVALUATION ADULT. - PERTINENT LABORATORY DATA
01-10 Na147 mmol/L<H> Glu 120 mg/dL<H> K+ 4.9 mmol/L Cr  0.84 mg/dL BUN 33 mg/dL<H>   01-10 Phos 3.0 mg/dL   01-10 Alb 2.1 g/dL<L>        01-08-22 @ 20:55 HgbA1C 7.3  01-08-22 @ 19:12 HgbA1C 7.3

## 2022-01-10 NOTE — CONSULT NOTE ADULT - SUBJECTIVE AND OBJECTIVE BOX
Time of visit:    CHIEF COMPLAINT: Patient is a 81y old  Male who presents with a chief complaint of AHRF / (2022 02:17)      HPI:  81M from home, AAOX3, self ambulating with PMH of DM and HLD comes to thr ED with c.o fever and cough since 8 days. Pt appears tired, but AAOX3. Obtaining history is limited as pt is on BIPAP. Pt mentions having worsening shortness of breath today and visited the ED. He was called for RRT in the main lobby for hypoxia - spo2 61% on Room air. Pt also endorses dry cough and increased generalized weakness.   Pt denies any sick contact and stays with grand child. Pt is vaccinated with J&J.     In the ED,   Pt was started on the NRB 15L, Spo2 87% ---> started on BIPAP    CXR RLL infiltrate-> called for code sepsis   COVID+   ABG- Respiratory acidosis  (2022 12:46)   Patient seen and examined.     PAST MEDICAL & SURGICAL HISTORY:  Diabetes mellitus    High cholesterol        Allergies    No Known Allergies    Intolerances        MEDICATIONS  (STANDING):  chlorhexidine 2% Cloths 1 Application(s) Topical <User Schedule>  dexAMETHasone  Injectable 6 milliGRAM(s) IV Push daily  heparin   Injectable 5000 Unit(s) SubCutaneous every 8 hours  insulin lispro (ADMELOG) corrective regimen sliding scale   SubCutaneous three times a day before meals  pantoprazole  Injectable 40 milliGRAM(s) IV Push daily  remdesivir  IVPB   IV Intermittent   remdesivir  IVPB 100 milliGRAM(s) IV Intermittent every 24 hours      MEDICATIONS  (PRN):   Medications up to date at time of exam.    Medications up to date at time of exam.    FAMILY HISTORY:      SOCIAL HISTORY  Smoking History: [   ] smoking/smoke exposure, [   ] former smoker  Living Condition: [   ] apartment, [   ] private house  Work History:   Travel History: denies recent travel  Illicit Substance Use: denies  Alcohol Use: denies    REVIEW OF SYSTEMS:    CONSTITUTIONAL:  denies fevers, chills, sweats, weight loss    HEENT:  denies diplopia or blurred vision, sore throat or runny nose.    CARDIOVASCULAR:  denies pressure, squeezing, tightness, or heaviness about the chest; no palpitations.    RESPIRATORY:  denies SOB, cough, MENDEZ, wheezing.    GASTROINTESTINAL:  denies abdominal pain, nausea, vomiting or diarrhea.    GENITOURINARY: denies dysuria, frequency or urgency.    NEUROLOGIC:  denies numbness, tingling, seizures or weakness.    PSYCHIATRIC:  denies disorder of thought or mood.    MSK: denies swelling, redness      PHYSICAL EXAMINATION:    GENERAL: The patient is a well-developed, well-nourished, in no apparent distress.     Vital Signs Last 24 Hrs  T(C): 36.8 (10 William 2022 08:00), Max: 36.9 (2022 16:16)  T(F): 98.2 (10 William 2022 08:00), Max: 98.4 (2022 16:16)  HR: 64 (10 William 2022 09:00) (63 - 89)  BP: 104/58 (10 William 2022 09:00) (91/53 - 151/75)  BP(mean): 69 (10 William 2022 09:00) (60 - 94)  RR: 22 (10 William 2022 09:00) (16 - 36)  SpO2: 86% (10 William 2022 09:00) (86% - 98%)   (if applicable)    Chest Tube (if applicable)    HEENT: Head is normocephalic and atraumatic. Extraocular muscles are intact. Mucous membranes are moist.     NECK: Supple, no palpable adenopathy.    LUNGS: Clear to auscultation, no wheezing, rales, or rhonchi.    HEART: Regular rate and rhythm without murmur.    ABDOMEN: Soft, nontender, and nondistended.  No hepatosplenomegaly is noted.    RENAL: No difficulty voiding, no pelvic pain    EXTREMITIES: Without any cyanosis, clubbing, rash, lesions or edema.    NEUROLOGIC: Awake, alert, oriented, grossly intact    SKIN: Warm, dry, good turgor.      LABS:                        15.8   8.81  )-----------( 199      ( 10 William 2022 05:15 )             52.2     01-10    147<H>  |  111<H>  |  33<H>  ----------------------------<  120<H>  4.9   |  30  |  0.84    Ca    8.1<L>      10 William 2022 05:15  Phos  3.0     01-10  Mg     2.8     01-10    TPro  5.8<L>  /  Alb  2.1<L>  /  TBili  0.8  /  DBili  x   /  AST  35  /  ALT  27  /  AlkPhos  58  01-10      Urinalysis Basic - ( 2022 00:22 )    Color: Yellow / Appearance: Clear / S.025 / pH: x  Gluc: x / Ketone: Trace  / Bili: Small / Urobili: 4   Blood: x / Protein: 500 mg/dL / Nitrite: Positive   Leuk Esterase: Small / RBC: 2-5 /HPF / WBC 3-5 /HPF   Sq Epi: x / Non Sq Epi: Few /HPF / Bacteria: Moderate /HPF      ABG - ( 2022 21:59 )  pH, Arterial: 7.54  pH, Blood: x     /  pCO2: 36    /  pO2: 112   / HCO3: 31    / Base Excess: 8.0   /  SaO2: 99                          Procalcitonin, Serum: 0.22 ng/mL (22 @ 11:44)      MICROBIOLOGY: (if applicable)    RADIOLOGY & ADDITIONAL STUDIES:  EKG:   CXR:  ECHO:    IMPRESSION: 81y Male PAST MEDICAL & SURGICAL HISTORY:  Diabetes mellitus    High cholesterol     p/w                   RECOMMENDATIONS:

## 2022-01-10 NOTE — PROGRESS NOTE ADULT - ASSESSMENT
81M from home, AAOX3, self ambulating with PMH of DM and HLD comes to thr ED with c.o fever and cough since 8 days.  He was called for RRT in the main lobby for hypoxia - spo2 61% on Room air. Pt also endorses dry cough and increased generalized weakness.     In the ED,   Pt was started on the NRB 15L, Spo2 87% ---> started on BIPAP 16/8/100   CXR RLL infiltrate-> called for code sepsis   ABG- Respiratory acidosis     FULL CODE       ICU was consulted for Hypoxia 2/2 Pna       Assessment:    Acute Hypoxic Respiratory Failure   RLL Pna   DM   HLD   ÁNGEL     PLAN:    #CNS:  Pt is mildly lethargic, AAOX3     #CVS  / 75, hr 85  Pt has h/o of HLD ( not on any meds)  Trops- 105, EKG- RBBB  F/U trops 2 & 3     #Resp:  Acute Hypoxic Respiratory Failure:  Pt noted to be hypoxic 61% on RA--> Pt was started on the NRB 15L, Spo2 87% ---> started on BIPAP 16/8/100   COVID+,  CXR RLL infiltrate-> called for code sepsis   ABG- 7.32/70/75/36 -  Respiratory acidosis   Pt started on Remedesevir and decadron   f/u Covid Inf markers  f/u Bld cx and sputum cx   c.w BIPAP in icu   D-dimer 365     #Renal:  ÁNGEL:  Pt noted to have BUN/ Cr 36/ 1.45   eGFR- 45  Pt denies any renal disorders   Likely ÁNGEL 2/2 dehydration   f/u BMP daily   c/w mild hydration     #ENDO:  Pt has PMH of DM   ? takes Metformin 500mg BD at home, not on insulin   RBS- 198   C/W HSS   F/u A1C       #ID:  Mild Leucocytosis - 12k   Likely 2/2 pna   c/w Azithro and ceftriaxone  f/u bld cx, sputum cx     #GI-  No issues   On DASH diet   To be kept NPO when on BIPAP     #MSK/SKIN   no issues     #PPX   PPI  Heparin    GOC  FULL CODE     DISPO:  Pt to be moved to ICU for further care        81M from home, AAOX3, self ambulating with PMH of DM and HLD comes to thr ED with c.o fever and cough since 8 days.  He was called for RRT in the main lobby for hypoxia - spo2 61% on Room air. Pt also endorses dry cough and increased generalized weakness.     In the ED,   Pt was started on the NRB 15L, Spo2 87% ---> started on BIPAP 16/8/100   CXR RLL infiltrate-> called for code sepsis   ABG- Respiratory acidosis     FULL CODE       ICU was consulted for Hypoxia 2/2 Pna       Assessment:    Acute Hypoxic Respiratory Failure   RLL Pna   DM   HLD   ÁNGEL     PLAN:    #CNS:  Pt is mildly lethargic, AAOX3     #CVS  / 75, hr 85  Pt has h/o of HLD ( not on any meds)  Trops- 105, EKG- RBBB  Troponin started to downtrend.     #Resp:  Acute Hypoxic Respiratory Failure:  Pt noted to be hypoxic 61% on RA--> Pt was started on the NRB 15L, Spo2 87% ---> started on BIPAP 16/8/100   COVID+,  CXR RLL infiltrate-> called for code sepsis   ABG- 7.32/70/75/36 -  Respiratory acidosis   Pt started on Remedesevir and decadron   f/u Covid Inf markers  f/u Bld cx and sputum cx   c.w BIPAP in icu   D-dimer 365     #Renal:  ÁNGEL:  Pt noted to have BUN/ Cr 36/ 1.45   eGFR- 45  Pt denies any renal disorders   Likely ÁNGEL 2/2 dehydration   f/u BMP daily   c/w mild hydration     #ENDO:  Pt has PMH of DM   ? takes Metformin 500mg BD at home, not on insulin   RBS- 198   C/W HSS   F/u A1C       #ID:  Mild Leucocytosis - 12k   Likely 2/2 pna   c/w Azithro and ceftriaxone  f/u bld cx, sputum cx     #GI-  No issues   On DASH diet   To be kept NPO when on BIPAP     #MSK/SKIN   no issues     #PPX   PPI  Heparin    GOC  FULL CODE     DISPO:  Pt to be moved to ICU for further care        81M from home, AAOX3, self ambulating with PMH of DM and HLD comes to thr ED with c.o fever and cough since 8 days.  He was called for RRT in the main lobby for hypoxia - spo2 61% on Room air. Pt also endorses dry cough and increased generalized weakness.     In the ED,   Pt was started on the NRB 15L, Spo2 87% ---> started on BIPAP 16/8/100   CXR RLL infiltrate-> called for code sepsis   ABG- Respiratory acidosis     FULL CODE       ICU was consulted for Hypoxia 2/2 Pna       Assessment:    Acute Hypoxic Respiratory Failure   RLL Pna   DM   HLD   ÁNGEL     PLAN:    #CNS:  Pt is mildly lethargic, AAOX3     #CVS  / 75, hr 85  Pt has h/o of HLD ( not on any meds)  Trops- 105, EKG- RBBB  Troponin started to downtrend.     #Resp:  Acute Hypoxic Respiratory Failure:  Pt noted to be hypoxic 61% on RA--> Pt was started on the NRB 15L, Spo2 87% ---> started on BIPAP 16/8/100   COVID+,  CXR RLL infiltrate-> called for code sepsis   ABG- 7.32/70/75/36 -  Respiratory acidosis   Pt started on Remedesevir and decadron   - Pt intubated 1/10/22 as pts continued to desat.   f/u Covid Inf markers  f/u Bld cx and sputum cx     #Renal:  ÁNGEL:  Pt noted to have BUN/ Cr 36/ 1.45   eGFR- 45  Pt denies any renal disorders   Likely ÁNGEL 2/2 dehydration   - Resolved.       #ENDO:  Pt has PMH of DM   ? takes Metformin 500mg BD at home, not on insulin   RBS- 198   C/W HSS   A1C - 7.3      #ID:  Mild Leucocytosis - 12k   Likely 2/2 pna   c/w Azithro and ceftriaxone  f/u bld cx, sputum cx     #GI-  No issues   On DASH diet   To be kept NPO when on BIPAP     #MSK/SKIN   no issues     #PPX   PPI  Heparin    GOC  FULL CODE     DISPO:  Pt to be moved to ICU for further care

## 2022-01-10 NOTE — PROCEDURE NOTE - NSPROCDETAILS_GEN_ALL_CORE
location identified, draped/prepped, sterile technique used, needle inserted/introduced/positive blood return obtained via catheter/connected to a pressurized flush line/sutured in place/hemostasis with direct pressure, dressing applied
guidewire recovered/lumen(s) aspirated and flushed/sterile dressing applied/sterile technique, catheter placed/ultrasound guidance with use of sterile gel and probe cove

## 2022-01-10 NOTE — CHART NOTE - NSCHARTNOTEFT_GEN_A_CORE
[id number] - [place] inter  Pts son Geovany was updated about pts worsening condition. Pts son understood. Pts son was asked about code status and told the team he wants to continue to make sure everything is done for his father including intubation and CPR. Pt remains full code.

## 2022-01-11 NOTE — PROGRESS NOTE ADULT - SUBJECTIVE AND OBJECTIVE BOX
INTERVAL HPI/OVERNIGHT EVENTS: ***    PRESSORS: [ ] YES [ ] NO    Antimicrobial:  remdesivir  IVPB   IV Intermittent   remdesivir  IVPB 100 milliGRAM(s) IV Intermittent every 24 hours    Cardiovascular:  norepinephrine Infusion 0.05 MICROgram(s)/kG/Min IV Continuous <Continuous>    Pulmonary:    Hematalogic:  heparin   Injectable 5000 Unit(s) SubCutaneous every 8 hours    Other:  chlorhexidine 0.12% Liquid 15 milliLiter(s) Oral Mucosa every 12 hours  chlorhexidine 2% Cloths 1 Application(s) Topical <User Schedule>  cisatracurium Infusion 3 MICROgram(s)/kG/Min IV Continuous <Continuous>  dexAMETHasone  Injectable 6 milliGRAM(s) IV Push daily  fentaNYL   Infusion. 2 MICROgram(s)/kG/Hr IV Continuous <Continuous>  insulin lispro (ADMELOG) corrective regimen sliding scale   SubCutaneous every 6 hours  ketamine Infusion. 0.3 mG/kG/Hr IV Continuous <Continuous>  pantoprazole  Injectable 40 milliGRAM(s) IV Push daily  propofol Infusion 40 MICROgram(s)/kG/Min IV Continuous <Continuous>  sodium chloride 0.9% lock flush 10 milliLiter(s) IV Push every 1 hour PRN    chlorhexidine 0.12% Liquid 15 milliLiter(s) Oral Mucosa every 12 hours  chlorhexidine 2% Cloths 1 Application(s) Topical <User Schedule>  cisatracurium Infusion 3 MICROgram(s)/kG/Min IV Continuous <Continuous>  dexAMETHasone  Injectable 6 milliGRAM(s) IV Push daily  fentaNYL   Infusion. 2 MICROgram(s)/kG/Hr IV Continuous <Continuous>  heparin   Injectable 5000 Unit(s) SubCutaneous every 8 hours  insulin lispro (ADMELOG) corrective regimen sliding scale   SubCutaneous every 6 hours  ketamine Infusion. 0.3 mG/kG/Hr IV Continuous <Continuous>  norepinephrine Infusion 0.05 MICROgram(s)/kG/Min IV Continuous <Continuous>  pantoprazole  Injectable 40 milliGRAM(s) IV Push daily  propofol Infusion 40 MICROgram(s)/kG/Min IV Continuous <Continuous>  remdesivir  IVPB   IV Intermittent   remdesivir  IVPB 100 milliGRAM(s) IV Intermittent every 24 hours  sodium chloride 0.9% lock flush 10 milliLiter(s) IV Push every 1 hour PRN    Drug Dosing Weight  Height (cm): 172.7 (08 Jan 2022 15:30)  Weight (kg): 88.4 (08 Jan 2022 15:30)  BMI (kg/m2): 29.6 (08 Jan 2022 15:30)  BSA (m2): 2.02 (08 Jan 2022 15:30)    CENTRAL LINE: [ ] YES [ ] NO  LOCATION:   DATE INSERTED:  REMOVE: [ ] YES [ ] NO  EXPLAIN:    HER: [ ] YES [ ] NO    DATE INSERTED:  REMOVE:  [ ] YES [ ] NO  EXPLAIN:    A-LINE:  [ ] YES [ ] NO  LOCATION:   DATE INSERTED:  REMOVE:  [ ] YES [ ] NO  EXPLAIN:    PMH -reviewed admission note, no change since admission      ABG - ( 11 Jan 2022 03:21 )  pH, Arterial: 7.34  pH, Blood: x     /  pCO2: 58    /  pO2: 59    / HCO3: 31    / Base Excess: 3.9   /  SaO2: 89                    01-10 @ 07:01  -  01-11 @ 07:00  --------------------------------------------------------  IN: 1443.9 mL / OUT: 1360 mL / NET: 83.9 mL        Mode: AC/ CMV (Assist Control/ Continuous Mandatory Ventilation)  RR (machine): 28  TV (machine): 450  FiO2: 100  PEEP: 15  ITime: 1  MAP: 21  PIP: 30      PHYSICAL EXAM:    GENERAL: NAD, well-groomed, well-developed  HEAD:  Atraumatic, Normocephalic  EYES: EOMI, PERRLA, conjunctiva and sclera clear  ENMT: No tonsillar erythema, exudates, or enlargement; Moist mucous membranes, Good dentition, [ ]No lesions  NECK: Supple, normal appearance, No JVD; Normal thyroid; Trachea midline  NERVOUS SYSTEM:  Alert & Oriented X3, Good concentration; Motor Strength 5/5 B/L upper and lower extremities; DTRs 2+ intact and symmetric  CHEST/LUNG: No chest deformity; Normal percussion bilaterally; No rales, rhonchi, wheezing   HEART: Regular rate and rhythm; No murmurs, rubs, or gallops  ABDOMEN: Soft, Nontender, Nondistended;Bowel sounds present  EXTREMITIES:  2+ Peripheral Pulses, No clubbing, cyanosis, or edema  LYMPH: No lymphadenopathy noted  SKIN: No rashes or lesions; Good capillary refill      LABS:  CBC Full  -  ( 11 Jan 2022 04:24 )  WBC Count : 9.37 K/uL  RBC Count : 5.16 M/uL  Hemoglobin : 15.6 g/dL  Hematocrit : 52.2 %  Platelet Count - Automated : 256 K/uL  Mean Cell Volume : 101.2 fl  Mean Cell Hemoglobin : 30.2 pg  Mean Cell Hemoglobin Concentration : 29.9 gm/dL  Auto Neutrophil # : 8.12 K/uL  Auto Lymphocyte # : 0.58 K/uL  Auto Monocyte # : 0.62 K/uL  Auto Eosinophil # : 0.00 K/uL  Auto Basophil # : 0.01 K/uL  Auto Neutrophil % : 86.7 %  Auto Lymphocyte % : 6.2 %  Auto Monocyte % : 6.6 %  Auto Eosinophil % : 0.0 %  Auto Basophil % : 0.1 %    01-11    148<H>  |  113<H>  |  46<H>  ----------------------------<  164<H>  5.1   |  30  |  1.17    Ca    8.1<L>      11 Jan 2022 04:24  Phos  3.8     01-11  Mg     2.9     01-11    TPro  5.7<L>  /  Alb  1.9<L>  /  TBili  0.7  /  DBili  x   /  AST  26  /  ALT  22  /  AlkPhos  60  01-11        Culture Results:   No growth to date. (01-08 @ 18:49)  Culture Results:   No growth to date. (01-08 @ 18:49)      RADIOLOGY & ADDITIONAL STUDIES REVIEWED:  ***    [ ]GOALS OF CARE DISCUSSION WITH PATIENT/FAMILY/PROXY:    CRITICAL CARE TIME SPENT: 35 minutes INTERVAL HPI/OVERNIGHT EVENTS: Started pt on Nimbex and Ketamine drip. Will try to prone pt to help increase oxygen.     PRESSORS: [X] YES [ ] NO - Norepinephrine     Antimicrobial:  remdesivir  IVPB   IV Intermittent   remdesivir  IVPB 100 milliGRAM(s) IV Intermittent every 24 hours    Cardiovascular:  norepinephrine Infusion 0.05 MICROgram(s)/kG/Min IV Continuous <Continuous>    Pulmonary:    Hematalogic:  heparin   Injectable 5000 Unit(s) SubCutaneous every 8 hours    Other:  chlorhexidine 0.12% Liquid 15 milliLiter(s) Oral Mucosa every 12 hours  chlorhexidine 2% Cloths 1 Application(s) Topical <User Schedule>  cisatracurium Infusion 3 MICROgram(s)/kG/Min IV Continuous <Continuous>  dexAMETHasone  Injectable 6 milliGRAM(s) IV Push daily  fentaNYL   Infusion. 2 MICROgram(s)/kG/Hr IV Continuous <Continuous>  insulin lispro (ADMELOG) corrective regimen sliding scale   SubCutaneous every 6 hours  ketamine Infusion. 0.3 mG/kG/Hr IV Continuous <Continuous>  pantoprazole  Injectable 40 milliGRAM(s) IV Push daily  propofol Infusion 40 MICROgram(s)/kG/Min IV Continuous <Continuous>  sodium chloride 0.9% lock flush 10 milliLiter(s) IV Push every 1 hour PRN    chlorhexidine 0.12% Liquid 15 milliLiter(s) Oral Mucosa every 12 hours  chlorhexidine 2% Cloths 1 Application(s) Topical <User Schedule>  cisatracurium Infusion 3 MICROgram(s)/kG/Min IV Continuous <Continuous>  dexAMETHasone  Injectable 6 milliGRAM(s) IV Push daily  fentaNYL   Infusion. 2 MICROgram(s)/kG/Hr IV Continuous <Continuous>  heparin   Injectable 5000 Unit(s) SubCutaneous every 8 hours  insulin lispro (ADMELOG) corrective regimen sliding scale   SubCutaneous every 6 hours  ketamine Infusion. 0.3 mG/kG/Hr IV Continuous <Continuous>  norepinephrine Infusion 0.05 MICROgram(s)/kG/Min IV Continuous <Continuous>  pantoprazole  Injectable 40 milliGRAM(s) IV Push daily  propofol Infusion 40 MICROgram(s)/kG/Min IV Continuous <Continuous>  remdesivir  IVPB   IV Intermittent   remdesivir  IVPB 100 milliGRAM(s) IV Intermittent every 24 hours  sodium chloride 0.9% lock flush 10 milliLiter(s) IV Push every 1 hour PRN    Drug Dosing Weight  Height (cm): 172.7 (08 Jan 2022 15:30)  Weight (kg): 88.4 (08 Jan 2022 15:30)  BMI (kg/m2): 29.6 (08 Jan 2022 15:30)  BSA (m2): 2.02 (08 Jan 2022 15:30)    CENTRAL LINE: [ ] YES [ ] NO  LOCATION:   DATE INSERTED:  REMOVE: [ ] YES [ ] NO  EXPLAIN:    HER: [ ] YES [ ] NO    DATE INSERTED:  REMOVE:  [ ] YES [ ] NO  EXPLAIN:    A-LINE:  [ ] YES [ ] NO  LOCATION:   DATE INSERTED:  REMOVE:  [ ] YES [ ] NO  EXPLAIN:    PMH -reviewed admission note, no change since admission      ABG - ( 11 Jan 2022 03:21 )  pH, Arterial: 7.34  pH, Blood: x     /  pCO2: 58    /  pO2: 59    / HCO3: 31    / Base Excess: 3.9   /  SaO2: 89                    01-10 @ 07:01  -  01-11 @ 07:00  --------------------------------------------------------  IN: 1443.9 mL / OUT: 1360 mL / NET: 83.9 mL        Mode: AC/ CMV (Assist Control/ Continuous Mandatory Ventilation)  RR (machine): 28  TV (machine): 450  FiO2: 100  PEEP: 15  ITime: 1  MAP: 21  PIP: 30      PHYSICAL EXAM:    GENERAL: NAD, well-groomed, well-developed  HEAD:  Atraumatic, Normocephalic  EYES: EOMI, PERRLA, conjunctiva and sclera clear  ENMT: No tonsillar erythema, exudates, or enlargement; Moist mucous membranes, Good dentition, [ ]No lesions  NECK: Supple, normal appearance, No JVD; Normal thyroid; Trachea midline  NERVOUS SYSTEM:  Alert & Oriented X3, Good concentration; Motor Strength 5/5 B/L upper and lower extremities; DTRs 2+ intact and symmetric  CHEST/LUNG: No chest deformity; Normal percussion bilaterally; No rales, rhonchi, wheezing   HEART: Regular rate and rhythm; No murmurs, rubs, or gallops  ABDOMEN: Soft, Nontender, Nondistended;Bowel sounds present  EXTREMITIES:  2+ Peripheral Pulses, No clubbing, cyanosis, or edema  LYMPH: No lymphadenopathy noted  SKIN: No rashes or lesions; Good capillary refill      LABS:  CBC Full  -  ( 11 Jan 2022 04:24 )  WBC Count : 9.37 K/uL  RBC Count : 5.16 M/uL  Hemoglobin : 15.6 g/dL  Hematocrit : 52.2 %  Platelet Count - Automated : 256 K/uL  Mean Cell Volume : 101.2 fl  Mean Cell Hemoglobin : 30.2 pg  Mean Cell Hemoglobin Concentration : 29.9 gm/dL  Auto Neutrophil # : 8.12 K/uL  Auto Lymphocyte # : 0.58 K/uL  Auto Monocyte # : 0.62 K/uL  Auto Eosinophil # : 0.00 K/uL  Auto Basophil # : 0.01 K/uL  Auto Neutrophil % : 86.7 %  Auto Lymphocyte % : 6.2 %  Auto Monocyte % : 6.6 %  Auto Eosinophil % : 0.0 %  Auto Basophil % : 0.1 %    01-11    148<H>  |  113<H>  |  46<H>  ----------------------------<  164<H>  5.1   |  30  |  1.17    Ca    8.1<L>      11 Jan 2022 04:24  Phos  3.8     01-11  Mg     2.9     01-11    TPro  5.7<L>  /  Alb  1.9<L>  /  TBili  0.7  /  DBili  x   /  AST  26  /  ALT  22  /  AlkPhos  60  01-11        Culture Results:   No growth to date. (01-08 @ 18:49)  Culture Results:   No growth to date. (01-08 @ 18:49)      RADIOLOGY & ADDITIONAL STUDIES REVIEWED:  ***    [ ]GOALS OF CARE DISCUSSION WITH PATIENT/FAMILY/PROXY:    CRITICAL CARE TIME SPENT: 35 minutes INTERVAL HPI/OVERNIGHT EVENTS: Started pt on Nimbex and Ketamine drip. Will try to prone pt to help increase oxygen.     PRESSORS: [X] YES [ ] NO - Norepinephrine     Antimicrobial:  remdesivir  IVPB   IV Intermittent   remdesivir  IVPB 100 milliGRAM(s) IV Intermittent every 24 hours    Cardiovascular:  norepinephrine Infusion 0.05 MICROgram(s)/kG/Min IV Continuous <Continuous>    Pulmonary:    Hematalogic:  heparin   Injectable 5000 Unit(s) SubCutaneous every 8 hours    Other:  chlorhexidine 0.12% Liquid 15 milliLiter(s) Oral Mucosa every 12 hours  chlorhexidine 2% Cloths 1 Application(s) Topical <User Schedule>  cisatracurium Infusion 3 MICROgram(s)/kG/Min IV Continuous <Continuous>  dexAMETHasone  Injectable 6 milliGRAM(s) IV Push daily  fentaNYL   Infusion. 2 MICROgram(s)/kG/Hr IV Continuous <Continuous>  insulin lispro (ADMELOG) corrective regimen sliding scale   SubCutaneous every 6 hours  ketamine Infusion. 0.3 mG/kG/Hr IV Continuous <Continuous>  pantoprazole  Injectable 40 milliGRAM(s) IV Push daily  propofol Infusion 40 MICROgram(s)/kG/Min IV Continuous <Continuous>  sodium chloride 0.9% lock flush 10 milliLiter(s) IV Push every 1 hour PRN    chlorhexidine 0.12% Liquid 15 milliLiter(s) Oral Mucosa every 12 hours  chlorhexidine 2% Cloths 1 Application(s) Topical <User Schedule>  cisatracurium Infusion 3 MICROgram(s)/kG/Min IV Continuous <Continuous>  dexAMETHasone  Injectable 6 milliGRAM(s) IV Push daily  fentaNYL   Infusion. 2 MICROgram(s)/kG/Hr IV Continuous <Continuous>  heparin   Injectable 5000 Unit(s) SubCutaneous every 8 hours  insulin lispro (ADMELOG) corrective regimen sliding scale   SubCutaneous every 6 hours  ketamine Infusion. 0.3 mG/kG/Hr IV Continuous <Continuous>  norepinephrine Infusion 0.05 MICROgram(s)/kG/Min IV Continuous <Continuous>  pantoprazole  Injectable 40 milliGRAM(s) IV Push daily  propofol Infusion 40 MICROgram(s)/kG/Min IV Continuous <Continuous>  remdesivir  IVPB   IV Intermittent   remdesivir  IVPB 100 milliGRAM(s) IV Intermittent every 24 hours  sodium chloride 0.9% lock flush 10 milliLiter(s) IV Push every 1 hour PRN    Drug Dosing Weight  Height (cm): 172.7 (08 Jan 2022 15:30)  Weight (kg): 88.4 (08 Jan 2022 15:30)  BMI (kg/m2): 29.6 (08 Jan 2022 15:30)  BSA (m2): 2.02 (08 Jan 2022 15:30)    CENTRAL LINE: [X] YES [ ] NO  LOCATION:   DATE INSERTED:  REMOVE: [ ] YES [ ] NO  EXPLAIN:    HER: [ ] YES [X] NO    DATE INSERTED:  REMOVE:  [ ] YES [ ] NO  EXPLAIN:    A-LINE:  [X] YES [ ] NO  LOCATION:   DATE INSERTED:  REMOVE:  [ ] YES [ ] NO  EXPLAIN:    PMH -reviewed admission note, no change since admission      ABG - ( 11 Jan 2022 03:21 )  pH, Arterial: 7.34  pH, Blood: x     /  pCO2: 58    /  pO2: 59    / HCO3: 31    / Base Excess: 3.9   /  SaO2: 89                    01-10 @ 07:01  -  01-11 @ 07:00  --------------------------------------------------------  IN: 1443.9 mL / OUT: 1360 mL / NET: 83.9 mL        Mode: AC/ CMV (Assist Control/ Continuous Mandatory Ventilation)  RR (machine): 28  TV (machine): 450  FiO2: 100  PEEP: 15  ITime: 1  MAP: 21  PIP: 30      PHYSICAL EXAM:    GENERAL: Intubated and sedated.   HEAD:  Atraumatic, Normocephalic  EYES: Pupils constrict  ENMT: No tonsillar erythema, exudates, or enlargement; Moist mucous membranes, Good dentition,   NECK: Supple, normal appearance, No JVD; Normal thyroid; Trachea midline  NERVOUS SYSTEM:  Intubated and sedated.   CHEST/LUNG: Crackles b/l   HEART: Regular rate and rhythm; No murmurs, rubs, or gallops  ABDOMEN: Soft, Nontender, Nondistended;Bowel sounds present  EXTREMITIES:  2+ Peripheral Pulses, No clubbing, cyanosis, or edema  LYMPH: No lymphadenopathy noted  SKIN: No rashes or lesions; Good capillary refill      LABS:  CBC Full  -  ( 11 Jan 2022 04:24 )  WBC Count : 9.37 K/uL  RBC Count : 5.16 M/uL  Hemoglobin : 15.6 g/dL  Hematocrit : 52.2 %  Platelet Count - Automated : 256 K/uL  Mean Cell Volume : 101.2 fl  Mean Cell Hemoglobin : 30.2 pg  Mean Cell Hemoglobin Concentration : 29.9 gm/dL  Auto Neutrophil # : 8.12 K/uL  Auto Lymphocyte # : 0.58 K/uL  Auto Monocyte # : 0.62 K/uL  Auto Eosinophil # : 0.00 K/uL  Auto Basophil # : 0.01 K/uL  Auto Neutrophil % : 86.7 %  Auto Lymphocyte % : 6.2 %  Auto Monocyte % : 6.6 %  Auto Eosinophil % : 0.0 %  Auto Basophil % : 0.1 %    01-11    148<H>  |  113<H>  |  46<H>  ----------------------------<  164<H>  5.1   |  30  |  1.17    Ca    8.1<L>      11 Jan 2022 04:24  Phos  3.8     01-11  Mg     2.9     01-11    TPro  5.7<L>  /  Alb  1.9<L>  /  TBili  0.7  /  DBili  x   /  AST  26  /  ALT  22  /  AlkPhos  60  01-11        Culture Results:   No growth to date. (01-08 @ 18:49)  Culture Results:   No growth to date. (01-08 @ 18:49)      RADIOLOGY & ADDITIONAL STUDIES REVIEWED:  ***    [ ]GOALS OF CARE DISCUSSION WITH PATIENT/FAMILY/PROXY:    CRITICAL CARE TIME SPENT: 35 minutes

## 2022-01-11 NOTE — PROGRESS NOTE ADULT - ASSESSMENT
81M from home, AAOX3, self ambulating with PMH of DM and HLD comes to thr ED with c.o fever and cough since 8 days.  He was called for RRT in the main lobby for hypoxia - spo2 61% on Room air. Pt also endorses dry cough and increased generalized weakness.     In the ED,   Pt was started on the NRB 15L, Spo2 87% ---> started on BIPAP 16/8/100   CXR RLL infiltrate-> called for code sepsis   ABG- Respiratory acidosis     FULL CODE       ICU was consulted for Hypoxia 2/2 Pna       Assessment:    Acute Hypoxic Respiratory Failure   RLL Pna   DM   HLD   ÁNGEL     PLAN:    #CNS:  Pt is mildly lethargic, AAOX3     #CVS  / 75, hr 85  Pt has h/o of HLD ( not on any meds)  Trops- 105, EKG- RBBB  Troponin started to downtrend.     #Resp:  Acute Hypoxic Respiratory Failure:  Pt noted to be hypoxic 61% on RA--> Pt was started on the NRB 15L, Spo2 87% ---> started on BIPAP 16/8/100   COVID+,  CXR RLL infiltrate-> called for code sepsis   ABG- 7.32/70/75/36 -  Respiratory acidosis   Pt started on Remedesevir and decadron   - Pt intubated 1/10/22 as pts continued to desat.   f/u Covid Inf markers  f/u Bld cx and sputum cx     #Renal:  ÁNGEL:  Pt noted to have BUN/ Cr 36/ 1.45   eGFR- 45  Pt denies any renal disorders   Likely ÁNGEL 2/2 dehydration   - Resolved.       #ENDO:  Pt has PMH of DM   ? takes Metformin 500mg BD at home, not on insulin   RBS- 198   C/W HSS   A1C - 7.3      #ID:  Mild Leucocytosis - 12k   Likely 2/2 pna   c/w Azithro and ceftriaxone  f/u bld cx, sputum cx     #GI-  No issues   On DASH diet   To be kept NPO when on BIPAP     #MSK/SKIN   no issues     #PPX   PPI  Heparin    GOC  FULL CODE     DISPO:  Pt to be moved to ICU for further care        81M from home, AAOX3, self ambulating with PMH of DM and HLD comes to thr ED with c.o fever and cough since 8 days.  He was called for RRT in the main lobby for hypoxia - spo2 61% on Room air. Pt also endorses dry cough and increased generalized weakness.     In the ED,   Pt was started on the NRB 15L, Spo2 87% ---> started on BIPAP 16/8/100   CXR RLL infiltrate-> called for code sepsis   ABG- Respiratory acidosis     FULL CODE       ICU was consulted for Hypoxia 2/2 Pna       Assessment:    Acute Hypoxic Respiratory Failure   RLL Pna   DM   HLD   ÁNGEL     PLAN:    #CNS:  - Sedated  Pt is mildly lethargic, AAOX3     #CVS  / 75, hr 85  Pt has h/o of HLD ( not on any meds)  Trops- 105, EKG- RBBB  Troponin started to downtrend.     #Resp:  Acute Hypoxic Respiratory Failure:  Pt noted to be hypoxic 61% on RA--> Pt was started on the NRB 15L, Spo2 87% ---> started on BIPAP 16/8/100   COVID+,  CXR RLL infiltrate-> called for code sepsis   ABG- 7.32/70/75/36 -  Respiratory acidosis   Pt started on Remedesevir and decadron   - Pt intubated 1/10/22 as pts continued to desat.   f/u Covid Inf markers  f/u Bld cx and sputum cx     #Renal:  ÁNGEL:  Pt noted to have BUN/ Cr 36/ 1.45   eGFR- 45  Pt denies any renal disorders   Likely ÁNGEL 2/2 dehydration   - Resolved.       #ENDO:  Pt has PMH of DM   ? takes Metformin 500mg BD at home, not on insulin   RBS- 198   C/W HSS   A1C - 7.3      #ID:  Mild Leucocytosis - 12k   Likely 2/2 pna   c/w Azithro and ceftriaxone  f/u bld cx, sputum cx     #GI-  No issues   On DASH diet   To be kept NPO when on BIPAP     #MSK/SKIN   no issues     #PPX   PPI  Heparin    GOC  FULL CODE     DISPO:  Pt to be moved to ICU for further care

## 2022-01-12 NOTE — PHARMACOTHERAPY INTERVENTION NOTE - COMMENTS
Recommended switching from heparin to enoxaparin due to heparin shortage.
Recommended switching from heparin to enoxaparin due to heparin shortage.
Recommended to obtain triglyceride levels during the use of propofol.

## 2022-01-12 NOTE — PROGRESS NOTE ADULT - ASSESSMENT
81M from home, AAOX3, self ambulating with PMH of DM and HLD comes to thr ED with c.o fever and cough since 8 days.  He was called for RRT in the main lobby for hypoxia - spo2 61% on Room air. Pt also endorses dry cough and increased generalized weakness.     In the ED,   Pt was started on the NRB 15L, Spo2 87% ---> started on BIPAP 16/8/100   CXR RLL infiltrate-> called for code sepsis   ABG- Respiratory acidosis     FULL CODE       ICU was consulted for Hypoxia 2/2 Pna       Assessment:    Acute Hypoxic Respiratory Failure   RLL Pna   DM   HLD   ÁNGEL     PLAN:    #CNS:  - Sedated  Pt is mildly lethargic, AAOX3     #CVS  / 75, hr 85  Pt has h/o of HLD ( not on any meds)  Trops- 105, EKG- RBBB  Troponin started to downtrend.     #Resp:  Acute Hypoxic Respiratory Failure:  Pt noted to be hypoxic 61% on RA--> Pt was started on the NRB 15L, Spo2 87% ---> started on BIPAP 16/8/100   COVID+,  CXR RLL infiltrate-> called for code sepsis   ABG- 7.32/70/75/36 -  Respiratory acidosis   Pt started on Remedesevir and decadron   - Pt intubated 1/10/22 as pts continued to desat.   f/u Covid Inf markers  f/u Bld cx and sputum cx     #Renal:  ÁNGEL:  Pt noted to have BUN/ Cr 36/ 1.45   eGFR- 45  Pt denies any renal disorders   Likely ÁNGEL 2/2 dehydration   - Resolved.       #ENDO:  Pt has PMH of DM   ? takes Metformin 500mg BD at home, not on insulin   RBS- 198   C/W HSS   A1C - 7.3      #ID:  Mild Leucocytosis - 12k   Likely 2/2 pna   c/w Azithro and ceftriaxone  f/u bld cx, sputum cx     #GI-  No issues   On DASH diet   To be kept NPO when on BIPAP     #MSK/SKIN   no issues     #PPX   PPI  Heparin    GOC  FULL CODE     DISPO:  Pt to be moved to ICU for further care        81M from home, AAOX3, self ambulating with PMH of DM and HLD comes to thr ED with c.o fever and cough since 8 days.  He was called for RRT in the main lobby for hypoxia - spo2 61% on Room air. Pt also endorses dry cough and increased generalized weakness.     In the ED,   Pt was started on the NRB 15L, Spo2 87% ---> started on BIPAP 16/8/100   CXR RLL infiltrate-> called for code sepsis   ABG- Respiratory acidosis     FULL CODE       ICU was consulted for Hypoxia 2/2 Pna       Assessment:    Acute Hypoxic Respiratory Failure   RLL Pna   DM   HLD   ÁNGEL     PLAN:    #CNS:  - Sedated  Pt is mildly lethargic, AAOX3     #CVS  / 75, hr 85  Pt has h/o of HLD ( not on any meds)  Trops- 105, EKG- RBBB  Troponin started to downtrend.     #Resp:  Acute Hypoxic Respiratory Failure:  Pt noted to be hypoxic 61% on RA--> Pt was started on the NRB 15L, Spo2 87% ---> started on BIPAP 16/8/100   COVID+,  CXR RLL infiltrate-> called for code sepsis   ABG- 7.32/70/75/36 -  Respiratory acidosis   Pt started on Remedesevir and decadron   - Pt intubated 1/10/22 as pts continued to desat.   - Continue to prone pt.   f/u Covid Inf markers  f/u Bld cx and sputum cx     #Renal:  ÁNGEL:  Pt noted to have BUN/ Cr 36/ 1.45   eGFR- 45  Pt denies any renal disorders   Likely ÁNGEL 2/2 dehydration   - Resolved.       #ENDO:  Pt has PMH of DM   ? takes Metformin 500mg BD at home, not on insulin   RBS- 198   C/W HSS   A1C - 7.3      #ID:  Mild Leucocytosis - 12k   Likely 2/2 pna   c/w Azithro and ceftriaxone  f/u bld cx, sputum cx     #GI-  No issues   On DASH diet   To be kept NPO when on BIPAP     #MSK/SKIN   no issues     #PPX   PPI  Heparin    GOC  FULL CODE     DISPO:  Pt to be moved to ICU for further care

## 2022-01-12 NOTE — PROGRESS NOTE ADULT - SUBJECTIVE AND OBJECTIVE BOX
INTERVAL HPI/OVERNIGHT EVENTS: ***    PRESSORS: [ ] YES [ ] NO    Antimicrobial:  remdesivir  IVPB   IV Intermittent   remdesivir  IVPB 100 milliGRAM(s) IV Intermittent every 24 hours    Cardiovascular:  norepinephrine Infusion 0.05 MICROgram(s)/kG/Min IV Continuous <Continuous>    Pulmonary:    Hematalogic:  heparin   Injectable 5000 Unit(s) SubCutaneous every 8 hours    Other:  chlorhexidine 0.12% Liquid 15 milliLiter(s) Oral Mucosa every 12 hours  chlorhexidine 2% Cloths 1 Application(s) Topical <User Schedule>  cisatracurium Infusion 3 MICROgram(s)/kG/Min IV Continuous <Continuous>  dexAMETHasone  Injectable 6 milliGRAM(s) IV Push daily  fentaNYL   Infusion. 2 MICROgram(s)/kG/Hr IV Continuous <Continuous>  insulin lispro (ADMELOG) corrective regimen sliding scale   SubCutaneous every 6 hours  ketamine Infusion. 0.3 mG/kG/Hr IV Continuous <Continuous>  pantoprazole  Injectable 40 milliGRAM(s) IV Push daily  propofol Infusion 40 MICROgram(s)/kG/Min IV Continuous <Continuous>  sodium chloride 0.9% lock flush 10 milliLiter(s) IV Push every 1 hour PRN    chlorhexidine 0.12% Liquid 15 milliLiter(s) Oral Mucosa every 12 hours  chlorhexidine 2% Cloths 1 Application(s) Topical <User Schedule>  cisatracurium Infusion 3 MICROgram(s)/kG/Min IV Continuous <Continuous>  dexAMETHasone  Injectable 6 milliGRAM(s) IV Push daily  fentaNYL   Infusion. 2 MICROgram(s)/kG/Hr IV Continuous <Continuous>  heparin   Injectable 5000 Unit(s) SubCutaneous every 8 hours  insulin lispro (ADMELOG) corrective regimen sliding scale   SubCutaneous every 6 hours  ketamine Infusion. 0.3 mG/kG/Hr IV Continuous <Continuous>  norepinephrine Infusion 0.05 MICROgram(s)/kG/Min IV Continuous <Continuous>  pantoprazole  Injectable 40 milliGRAM(s) IV Push daily  propofol Infusion 40 MICROgram(s)/kG/Min IV Continuous <Continuous>  remdesivir  IVPB 100 milliGRAM(s) IV Intermittent every 24 hours  remdesivir  IVPB   IV Intermittent   sodium chloride 0.9% lock flush 10 milliLiter(s) IV Push every 1 hour PRN    Drug Dosing Weight  Height (cm): 172.7 (08 Jan 2022 15:30)  Weight (kg): 88.4 (08 Jan 2022 15:30)  BMI (kg/m2): 29.6 (08 Jan 2022 15:30)  BSA (m2): 2.02 (08 Jan 2022 15:30)    CENTRAL LINE: [ ] YES [ ] NO  LOCATION:   DATE INSERTED:  REMOVE: [ ] YES [ ] NO  EXPLAIN:    HER: [ ] YES [ ] NO    DATE INSERTED:  REMOVE:  [ ] YES [ ] NO  EXPLAIN:    A-LINE:  [ ] YES [ ] NO  LOCATION:   DATE INSERTED:  REMOVE:  [ ] YES [ ] NO  EXPLAIN:    PMH -reviewed admission note, no change since admission      ABG - ( 12 Jan 2022 03:37 )  pH, Arterial: 7.31  pH, Blood: x     /  pCO2: 66    /  pO2: 151   / HCO3: 33    / Base Excess: 4.8   /  SaO2: 99                    01-11 @ 07:01  -  01-12 @ 07:00  --------------------------------------------------------  IN: 1916 mL / OUT: 1415 mL / NET: 501 mL        Mode: AC/ CMV (Assist Control/ Continuous Mandatory Ventilation)  RR (machine): 28  TV (machine): 450  FiO2: 100  PEEP: 15  ITime: 1  MAP: 26  PIP: 38      PHYSICAL EXAM:    GENERAL: NAD, well-groomed, well-developed  HEAD:  Atraumatic, Normocephalic  EYES: EOMI, PERRLA, conjunctiva and sclera clear  ENMT: No tonsillar erythema, exudates, or enlargement; Moist mucous membranes, Good dentition, [ ]No lesions  NECK: Supple, normal appearance, No JVD; Normal thyroid; Trachea midline  NERVOUS SYSTEM:  Alert & Oriented X3, Good concentration; Motor Strength 5/5 B/L upper and lower extremities; DTRs 2+ intact and symmetric  CHEST/LUNG: No chest deformity; Normal percussion bilaterally; No rales, rhonchi, wheezing   HEART: Regular rate and rhythm; No murmurs, rubs, or gallops  ABDOMEN: Soft, Nontender, Nondistended;Bowel sounds present  EXTREMITIES:  2+ Peripheral Pulses, No clubbing, cyanosis, or edema  LYMPH: No lymphadenopathy noted  SKIN: No rashes or lesions; Good capillary refill      LABS:  CBC Full  -  ( 12 Jan 2022 04:56 )  WBC Count : 8.64 K/uL  RBC Count : 5.01 M/uL  Hemoglobin : 15.2 g/dL  Hematocrit : 50.8 %  Platelet Count - Automated : 233 K/uL  Mean Cell Volume : 101.4 fl  Mean Cell Hemoglobin : 30.3 pg  Mean Cell Hemoglobin Concentration : 29.9 gm/dL  Auto Neutrophil # : 7.22 K/uL  Auto Lymphocyte # : 0.67 K/uL  Auto Monocyte # : 0.69 K/uL  Auto Eosinophil # : 0.01 K/uL  Auto Basophil # : 0.01 K/uL  Auto Neutrophil % : 83.5 %  Auto Lymphocyte % : 7.8 %  Auto Monocyte % : 8.0 %  Auto Eosinophil % : 0.1 %  Auto Basophil % : 0.1 %    01-12    147<H>  |  115<H>  |  40<H>  ----------------------------<  138<H>  5.0   |  33<H>  |  0.90    Ca    7.8<L>      12 Jan 2022 04:56  Phos  3.9     01-12  Mg     2.7     01-12    TPro  5.2<L>  /  Alb  1.5<L>  /  TBili  0.6  /  DBili  x   /  AST  20  /  ALT  18  /  AlkPhos  55  01-12        Culture Results:   No growth (01-10 @ 18:59)      RADIOLOGY & ADDITIONAL STUDIES REVIEWED:  ***    [ ]GOALS OF CARE DISCUSSION WITH PATIENT/FAMILY/PROXY:    CRITICAL CARE TIME SPENT: 35 minutes INTERVAL HPI/OVERNIGHT EVENTS: Pt was placed into supine on 6 am. Continuing to monitor will prone again.     PRESSORS: [X] YES [ ] NO    Antimicrobial:  remdesivir  IVPB   IV Intermittent   remdesivir  IVPB 100 milliGRAM(s) IV Intermittent every 24 hours    Cardiovascular:  norepinephrine Infusion 0.05 MICROgram(s)/kG/Min IV Continuous <Continuous>    Pulmonary:    Hematalogic:  heparin   Injectable 5000 Unit(s) SubCutaneous every 8 hours    Other:  chlorhexidine 0.12% Liquid 15 milliLiter(s) Oral Mucosa every 12 hours  chlorhexidine 2% Cloths 1 Application(s) Topical <User Schedule>  cisatracurium Infusion 3 MICROgram(s)/kG/Min IV Continuous <Continuous>  dexAMETHasone  Injectable 6 milliGRAM(s) IV Push daily  fentaNYL   Infusion. 2 MICROgram(s)/kG/Hr IV Continuous <Continuous>  insulin lispro (ADMELOG) corrective regimen sliding scale   SubCutaneous every 6 hours  ketamine Infusion. 0.3 mG/kG/Hr IV Continuous <Continuous>  pantoprazole  Injectable 40 milliGRAM(s) IV Push daily  propofol Infusion 40 MICROgram(s)/kG/Min IV Continuous <Continuous>  sodium chloride 0.9% lock flush 10 milliLiter(s) IV Push every 1 hour PRN    chlorhexidine 0.12% Liquid 15 milliLiter(s) Oral Mucosa every 12 hours  chlorhexidine 2% Cloths 1 Application(s) Topical <User Schedule>  cisatracurium Infusion 3 MICROgram(s)/kG/Min IV Continuous <Continuous>  dexAMETHasone  Injectable 6 milliGRAM(s) IV Push daily  fentaNYL   Infusion. 2 MICROgram(s)/kG/Hr IV Continuous <Continuous>  heparin   Injectable 5000 Unit(s) SubCutaneous every 8 hours  insulin lispro (ADMELOG) corrective regimen sliding scale   SubCutaneous every 6 hours  ketamine Infusion. 0.3 mG/kG/Hr IV Continuous <Continuous>  norepinephrine Infusion 0.05 MICROgram(s)/kG/Min IV Continuous <Continuous>  pantoprazole  Injectable 40 milliGRAM(s) IV Push daily  propofol Infusion 40 MICROgram(s)/kG/Min IV Continuous <Continuous>  remdesivir  IVPB 100 milliGRAM(s) IV Intermittent every 24 hours  remdesivir  IVPB   IV Intermittent   sodium chloride 0.9% lock flush 10 milliLiter(s) IV Push every 1 hour PRN    Drug Dosing Weight  Height (cm): 172.7 (08 Jan 2022 15:30)  Weight (kg): 88.4 (08 Jan 2022 15:30)  BMI (kg/m2): 29.6 (08 Jan 2022 15:30)  BSA (m2): 2.02 (08 Jan 2022 15:30)    CENTRAL LINE: [X] YES [ ] NO  LOCATION:   DATE INSERTED:  REMOVE: [ ] YES [ ] NO  EXPLAIN:    HER: [ X] YES [ ] NO    DATE INSERTED:  REMOVE:  [ ] YES [ ] NO  EXPLAIN:    A-LINE:  [X] YES [ ] NO  LOCATION:   DATE INSERTED:  REMOVE:  [ ] YES [ ] NO  EXPLAIN:    PMH -reviewed admission note, no change since admission      ABG - ( 12 Jan 2022 03:37 )  pH, Arterial: 7.31  pH, Blood: x     /  pCO2: 66    /  pO2: 151   / HCO3: 33    / Base Excess: 4.8   /  SaO2: 99                    01-11 @ 07:01  -  01-12 @ 07:00  --------------------------------------------------------  IN: 1916 mL / OUT: 1415 mL / NET: 501 mL        Mode: AC/ CMV (Assist Control/ Continuous Mandatory Ventilation)  RR (machine): 28  TV (machine): 450  FiO2: 100  PEEP: 15  ITime: 1  MAP: 26  PIP: 38      PHYSICAL EXAM:    GENERAL: NAD, well-groomed, well-developed  HEAD:  Atraumatic, Normocephalic  EYES: EOMI, PERRLA, conjunctiva and sclera clear  ENMT: No tonsillar erythema, exudates, or enlargement; Moist mucous membranes, Good dentition, [ ]No lesions  NECK: Supple, normal appearance, No JVD; Normal thyroid; Trachea midline  NERVOUS SYSTEM:  Alert & Oriented X3, Good concentration; Motor Strength 5/5 B/L upper and lower extremities; DTRs 2+ intact and symmetric  CHEST/LUNG: No chest deformity; Normal percussion bilaterally; No rales, rhonchi, wheezing   HEART: Regular rate and rhythm; No murmurs, rubs, or gallops  ABDOMEN: Soft, Nontender, Nondistended;Bowel sounds present  EXTREMITIES:  2+ Peripheral Pulses, No clubbing, cyanosis, or edema  LYMPH: No lymphadenopathy noted  SKIN: No rashes or lesions; Good capillary refill      LABS:  CBC Full  -  ( 12 Jan 2022 04:56 )  WBC Count : 8.64 K/uL  RBC Count : 5.01 M/uL  Hemoglobin : 15.2 g/dL  Hematocrit : 50.8 %  Platelet Count - Automated : 233 K/uL  Mean Cell Volume : 101.4 fl  Mean Cell Hemoglobin : 30.3 pg  Mean Cell Hemoglobin Concentration : 29.9 gm/dL  Auto Neutrophil # : 7.22 K/uL  Auto Lymphocyte # : 0.67 K/uL  Auto Monocyte # : 0.69 K/uL  Auto Eosinophil # : 0.01 K/uL  Auto Basophil # : 0.01 K/uL  Auto Neutrophil % : 83.5 %  Auto Lymphocyte % : 7.8 %  Auto Monocyte % : 8.0 %  Auto Eosinophil % : 0.1 %  Auto Basophil % : 0.1 %    01-12    147<H>  |  115<H>  |  40<H>  ----------------------------<  138<H>  5.0   |  33<H>  |  0.90    Ca    7.8<L>      12 Jan 2022 04:56  Phos  3.9     01-12  Mg     2.7     01-12    TPro  5.2<L>  /  Alb  1.5<L>  /  TBili  0.6  /  DBili  x   /  AST  20  /  ALT  18  /  AlkPhos  55  01-12        Culture Results:   No growth (01-10 @ 18:59)      RADIOLOGY & ADDITIONAL STUDIES REVIEWED:  ***    [ ]GOALS OF CARE DISCUSSION WITH PATIENT/FAMILY/PROXY:    CRITICAL CARE TIME SPENT: 35 minutes INTERVAL HPI/OVERNIGHT EVENTS: Pt was placed into supine on 6 am. Continuing to monitor will prone again.     PRESSORS: [X] YES [ ] NO    Antimicrobial:  remdesivir  IVPB   IV Intermittent   remdesivir  IVPB 100 milliGRAM(s) IV Intermittent every 24 hours    Cardiovascular:  norepinephrine Infusion 0.05 MICROgram(s)/kG/Min IV Continuous <Continuous>    Pulmonary:    Hematalogic:  heparin   Injectable 5000 Unit(s) SubCutaneous every 8 hours    Other:  chlorhexidine 0.12% Liquid 15 milliLiter(s) Oral Mucosa every 12 hours  chlorhexidine 2% Cloths 1 Application(s) Topical <User Schedule>  cisatracurium Infusion 3 MICROgram(s)/kG/Min IV Continuous <Continuous>  dexAMETHasone  Injectable 6 milliGRAM(s) IV Push daily  fentaNYL   Infusion. 2 MICROgram(s)/kG/Hr IV Continuous <Continuous>  insulin lispro (ADMELOG) corrective regimen sliding scale   SubCutaneous every 6 hours  ketamine Infusion. 0.3 mG/kG/Hr IV Continuous <Continuous>  pantoprazole  Injectable 40 milliGRAM(s) IV Push daily  propofol Infusion 40 MICROgram(s)/kG/Min IV Continuous <Continuous>  sodium chloride 0.9% lock flush 10 milliLiter(s) IV Push every 1 hour PRN    chlorhexidine 0.12% Liquid 15 milliLiter(s) Oral Mucosa every 12 hours  chlorhexidine 2% Cloths 1 Application(s) Topical <User Schedule>  cisatracurium Infusion 3 MICROgram(s)/kG/Min IV Continuous <Continuous>  dexAMETHasone  Injectable 6 milliGRAM(s) IV Push daily  fentaNYL   Infusion. 2 MICROgram(s)/kG/Hr IV Continuous <Continuous>  heparin   Injectable 5000 Unit(s) SubCutaneous every 8 hours  insulin lispro (ADMELOG) corrective regimen sliding scale   SubCutaneous every 6 hours  ketamine Infusion. 0.3 mG/kG/Hr IV Continuous <Continuous>  norepinephrine Infusion 0.05 MICROgram(s)/kG/Min IV Continuous <Continuous>  pantoprazole  Injectable 40 milliGRAM(s) IV Push daily  propofol Infusion 40 MICROgram(s)/kG/Min IV Continuous <Continuous>  remdesivir  IVPB 100 milliGRAM(s) IV Intermittent every 24 hours  remdesivir  IVPB   IV Intermittent   sodium chloride 0.9% lock flush 10 milliLiter(s) IV Push every 1 hour PRN    Drug Dosing Weight  Height (cm): 172.7 (08 Jan 2022 15:30)  Weight (kg): 88.4 (08 Jan 2022 15:30)  BMI (kg/m2): 29.6 (08 Jan 2022 15:30)  BSA (m2): 2.02 (08 Jan 2022 15:30)    CENTRAL LINE: [X] YES [ ] NO  LOCATION:   DATE INSERTED:  REMOVE: [ ] YES [ ] NO  EXPLAIN:    HER: [ X] YES [ ] NO    DATE INSERTED:  REMOVE:  [ ] YES [ ] NO  EXPLAIN:    A-LINE:  [X] YES [ ] NO  LOCATION:   DATE INSERTED:  REMOVE:  [ ] YES [ ] NO  EXPLAIN:    PMH -reviewed admission note, no change since admission      ABG - ( 12 Jan 2022 03:37 )  pH, Arterial: 7.31  pH, Blood: x     /  pCO2: 66    /  pO2: 151   / HCO3: 33    / Base Excess: 4.8   /  SaO2: 99                    01-11 @ 07:01  -  01-12 @ 07:00  --------------------------------------------------------  IN: 1916 mL / OUT: 1415 mL / NET: 501 mL        Mode: AC/ CMV (Assist Control/ Continuous Mandatory Ventilation)  RR (machine): 28  TV (machine): 450  FiO2: 100  PEEP: 15  ITime: 1  MAP: 26  PIP: 38      PHYSICAL EXAM:    GENERAL: Intubated and sedated.   HEAD:  Atraumatic, Normocephalic  EYES: Pupils constrict  ENMT: No tonsillar erythema, exudates, or enlargement; Moist mucous membranes, Good dentition,   NECK: Supple, normal appearance, No JVD; Normal thyroid; Trachea midline  NERVOUS SYSTEM:  Intubated and sedated.   CHEST/LUNG: Crackles b/l   HEART: Regular rate and rhythm; No murmurs, rubs, or gallops  ABDOMEN: Soft, Nontender, Nondistended;Bowel sounds present  EXTREMITIES:  2+ Peripheral Pulses, No clubbing, cyanosis, or edema  LYMPH: No lymphadenopathy noted  SKIN: No rashes or lesions; Good capillary refill      LABS:  CBC Full  -  ( 12 Jan 2022 04:56 )  WBC Count : 8.64 K/uL  RBC Count : 5.01 M/uL  Hemoglobin : 15.2 g/dL  Hematocrit : 50.8 %  Platelet Count - Automated : 233 K/uL  Mean Cell Volume : 101.4 fl  Mean Cell Hemoglobin : 30.3 pg  Mean Cell Hemoglobin Concentration : 29.9 gm/dL  Auto Neutrophil # : 7.22 K/uL  Auto Lymphocyte # : 0.67 K/uL  Auto Monocyte # : 0.69 K/uL  Auto Eosinophil # : 0.01 K/uL  Auto Basophil # : 0.01 K/uL  Auto Neutrophil % : 83.5 %  Auto Lymphocyte % : 7.8 %  Auto Monocyte % : 8.0 %  Auto Eosinophil % : 0.1 %  Auto Basophil % : 0.1 %    01-12    147<H>  |  115<H>  |  40<H>  ----------------------------<  138<H>  5.0   |  33<H>  |  0.90    Ca    7.8<L>      12 Jan 2022 04:56  Phos  3.9     01-12  Mg     2.7     01-12    TPro  5.2<L>  /  Alb  1.5<L>  /  TBili  0.6  /  DBili  x   /  AST  20  /  ALT  18  /  AlkPhos  55  01-12        Culture Results:   No growth (01-10 @ 18:59)      RADIOLOGY & ADDITIONAL STUDIES REVIEWED:  ***    [ ]GOALS OF CARE DISCUSSION WITH PATIENT/FAMILY/PROXY:    CRITICAL CARE TIME SPENT: 35 minutes

## 2022-01-13 NOTE — PROGRESS NOTE ADULT - SUBJECTIVE AND OBJECTIVE BOX
Time of Visit:  Patient seen and examined.     MEDICATIONS  (STANDING):  chlorhexidine 0.12% Liquid 15 milliLiter(s) Oral Mucosa every 12 hours  chlorhexidine 2% Cloths 1 Application(s) Topical <User Schedule>  cisatracurium Infusion 3 MICROgram(s)/kG/Min (15.9 mL/Hr) IV Continuous <Continuous>  dexAMETHasone  Injectable 6 milliGRAM(s) IV Push daily  enoxaparin Injectable 40 milliGRAM(s) SubCutaneous daily  fentaNYL   Infusion. 2 MICROgram(s)/kG/Hr (17.7 mL/Hr) IV Continuous <Continuous>  insulin lispro (ADMELOG) corrective regimen sliding scale   SubCutaneous every 6 hours  ketamine Infusion. 0.3 mG/kG/Hr (2.65 mL/Hr) IV Continuous <Continuous>  norepinephrine Infusion 0.05 MICROgram(s)/kG/Min (8.29 mL/Hr) IV Continuous <Continuous>  pantoprazole  Injectable 40 milliGRAM(s) IV Push daily  propofol Infusion 40 MICROgram(s)/kG/Min (21.2 mL/Hr) IV Continuous <Continuous>  senna Syrup 10 milliLiter(s) Oral daily      MEDICATIONS  (PRN):  sodium chloride 0.9% lock flush 10 milliLiter(s) IV Push every 1 hour PRN Pre/post blood products, medications, blood draw, and to maintain line patency       Medications up to date at time of exam.      PHYSICAL EXAMINATION:  Patient has no new complaints.  GENERAL: The patient is a well-developed, well-nourished, in no apparent distress.     Vital Signs Last 24 Hrs  T(C): 38 (13 Jan 2022 09:45), Max: 38 (13 Jan 2022 08:15)  T(F): 100.4 (13 Jan 2022 09:45), Max: 100.4 (13 Jan 2022 08:15)  HR: 91 (13 Jan 2022 09:45) (43 - 94)  BP: 111/60 (13 Jan 2022 09:00) (111/60 - 111/60)  BP(mean): 72 (13 Jan 2022 09:00) (72 - 72)  RR: 32 (13 Jan 2022 09:45) (0 - 32)  SpO2: 93% (13 Jan 2022 09:45) (82% - 100%)  Mode: AC/ CMV (Assist Control/ Continuous Mandatory Ventilation)  RR (machine): 32  TV (machine): 450  FiO2: 100  PEEP: 12  ITime: 1  MAP: 23  PIP: 40   (if applicable)    Chest Tube (if applicable)    HEENT: Head is normocephalic and atraumatic. Extraocular muscles are intact. Mucous membranes are moist.     NECK: Supple, no palpable adenopathy.    LUNGS: Clear to auscultation, no wheezing, rales, or rhonchi.    HEART: Regular rate and rhythm without murmur.    ABDOMEN: Soft, nontender, and nondistended.  No hepatosplenomegaly is noted.    : No painful voiding, no pelvic pain    EXTREMITIES: Without any cyanosis, clubbing, rash, lesions or edema.    NEUROLOGIC: Awake, alert, oriented, grossly intact    SKIN: Warm, dry, good turgor.      LABS:                        16.1   10.62 )-----------( 241      ( 13 Jan 2022 04:32 )             55.3     01-12    147<H>  |  115<H>  |  40<H>  ----------------------------<  138<H>  5.0   |  33<H>  |  0.90    Ca    7.8<L>      12 Jan 2022 04:56  Phos  4.1     01-13  Mg     2.8     01-13    TPro  5.2<L>  /  Alb  1.5<L>  /  TBili  0.6  /  DBili  x   /  AST  20  /  ALT  18  /  AlkPhos  55  01-12        ABG - ( 13 Jan 2022 08:25 )  pH, Arterial: 7.20  pH, Blood: x     /  pCO2: 77    /  pO2: 94    / HCO3: 30    / Base Excess: -0.1  /  SaO2: 98                    D-Dimer Assay, Quantitative: 2354 ng/mL DDU (01-13-22 @ 04:32)        Procalcitonin, Serum: 0.37 ng/mL (01-13-22 @ 09:44)  Procalcitonin, Serum: 0.20 ng/mL (01-12-22 @ 09:38)      MICROBIOLOGY: (if applicable)    RADIOLOGY & ADDITIONAL STUDIES:  EKG:   CXR:  ECHO:    IMPRESSION: 81y Male PAST MEDICAL & SURGICAL HISTORY:  Diabetes mellitus    High cholesterol     p/w           RECOMMENDATIONS:

## 2022-01-13 NOTE — PROGRESS NOTE ADULT - ASSESSMENT
81M from home, AAOX3, self ambulating with PMH of DM and HLD comes to thr ED with c.o fever and cough since 8 days.  He was called for RRT in the main lobby for hypoxia - spo2 61% on Room air. Pt also endorses dry cough and increased generalized weakness.     In the ED,   Pt was started on the NRB 15L, Spo2 87% ---> started on BIPAP 16/8/100   CXR RLL infiltrate-> called for code sepsis   ABG- Respiratory acidosis     FULL CODE       ICU was consulted for Hypoxia 2/2 Pna       Assessment:    Acute Hypoxic Respiratory Failure   RLL Pna   DM   HLD   ÁNGEL     PLAN:    #CNS:  - Sedated  Pt is mildly lethargic, AAOX3     #CVS  / 75, hr 85  Pt has h/o of HLD ( not on any meds)  Trops- 105, EKG- RBBB  Troponin started to downtrend.     #Resp:  Acute Hypoxic Respiratory Failure:  Pt noted to be hypoxic 61% on RA--> Pt was started on the NRB 15L, Spo2 87% ---> started on BIPAP 16/8/100   COVID+,  CXR RLL infiltrate-> called for code sepsis   ABG- 7.32/70/75/36 -  Respiratory acidosis   Pt started on Remedesevir and decadron   - Pt intubated 1/10/22 as pts continued to desat.   - Continue to prone pt.   f/u Covid Inf markers  f/u Bld cx and sputum cx     #Renal:  ÁNGEL:  Pt noted to have BUN/ Cr 36/ 1.45   eGFR- 45  Pt denies any renal disorders   Likely ÁNGEL 2/2 dehydration   - Resolved.       #ENDO:  Pt has PMH of DM   ? takes Metformin 500mg BD at home, not on insulin   RBS- 198   C/W HSS   A1C - 7.3      #ID:  Mild Leucocytosis - 12k   Likely 2/2 pna   c/w Azithro and ceftriaxone  f/u bld cx, sputum cx     #GI-  No issues   On DASH diet   To be kept NPO when on BIPAP     #MSK/SKIN   no issues     #PPX   PPI  Heparin    GOC  FULL CODE     DISPO:  Pt to be moved to ICU for further care        81M from home, AAOX3, self ambulating with PMH of DM and HLD comes to thr ED with c.o fever and cough since 8 days.  He was called for RRT in the main lobby for hypoxia - spo2 61% on Room air. Pt also endorses dry cough and increased generalized weakness.     In the ED,   Pt was started on the NRB 15L, Spo2 87% ---> started on BIPAP 16/8/100   CXR RLL infiltrate-> called for code sepsis   ABG- Respiratory acidosis     FULL CODE       ICU was consulted for Hypoxia 2/2 Pna       Assessment:    Acute Hypoxic Respiratory Failure   RLL Pna   DM   HLD   ÁNGEL     PLAN:    #CNS:  - Sedated    #CVS  / 75, hr 85  Pt has h/o of HLD ( not on any meds)  Trops- 105, EKG- RBBB  Troponin started to downtrend.     #Resp:  Acute Hypoxic Respiratory Failure:  Pt noted to be hypoxic 61% on RA--> Pt was started on the NRB 15L, Spo2 87% ---> started on BIPAP 16/8/100   COVID+,  CXR RLL infiltrate-> called for code sepsis   ABG- 7.32/70/75/36 -  Respiratory acidosis   Pt started on Remedesevir and decadron   - Pt intubated 1/10/22 as pts continued to desat.   - Continue to prone pt.  f/u Covid Inf markers  f/u Bld cx and sputum cx     #Renal:  ÁNGEL:  Pt noted to have BUN/ Cr 36/ 1.45   eGFR- 45  Pt denies any renal disorders   Likely ÁNGEL 2/2 dehydration   - Resolved.       #ENDO:  Pt has PMH of DM   takes Metformin 500mg BD at home, not on insulin   RBS- 198   C/W HSS   A1C - 7.3      #ID:  Mild Leucocytosis - 12k   Likely 2/2 pna   c/w Azithro and ceftriaxone  f/u bld cx, sputum cx     #GI-  No issues   On DASH diet   To be kept NPO when on BIPAP     #MSK/SKIN   no issues     #PPX   PPI  Heparin    GOC  FULL CODE     DISPO:  Pt to be moved to ICU for further care

## 2022-01-13 NOTE — PROGRESS NOTE ADULT - SUBJECTIVE AND OBJECTIVE BOX
INTERVAL HPI/OVERNIGHT EVENTS: ***    PRESSORS: [ ] YES [ ] NO    Antimicrobial:    Cardiovascular:  norepinephrine Infusion 0.05 MICROgram(s)/kG/Min IV Continuous <Continuous>    Pulmonary:    Hematalogic:  enoxaparin Injectable 40 milliGRAM(s) SubCutaneous daily    Other:  chlorhexidine 0.12% Liquid 15 milliLiter(s) Oral Mucosa every 12 hours  chlorhexidine 2% Cloths 1 Application(s) Topical <User Schedule>  cisatracurium Infusion 3 MICROgram(s)/kG/Min IV Continuous <Continuous>  dexAMETHasone  Injectable 6 milliGRAM(s) IV Push daily  fentaNYL   Infusion. 2 MICROgram(s)/kG/Hr IV Continuous <Continuous>  insulin lispro (ADMELOG) corrective regimen sliding scale   SubCutaneous every 6 hours  ketamine Infusion. 0.3 mG/kG/Hr IV Continuous <Continuous>  pantoprazole  Injectable 40 milliGRAM(s) IV Push daily  propofol Infusion 40 MICROgram(s)/kG/Min IV Continuous <Continuous>  senna Syrup 10 milliLiter(s) Oral daily  sodium chloride 0.9% lock flush 10 milliLiter(s) IV Push every 1 hour PRN    chlorhexidine 0.12% Liquid 15 milliLiter(s) Oral Mucosa every 12 hours  chlorhexidine 2% Cloths 1 Application(s) Topical <User Schedule>  cisatracurium Infusion 3 MICROgram(s)/kG/Min IV Continuous <Continuous>  dexAMETHasone  Injectable 6 milliGRAM(s) IV Push daily  enoxaparin Injectable 40 milliGRAM(s) SubCutaneous daily  fentaNYL   Infusion. 2 MICROgram(s)/kG/Hr IV Continuous <Continuous>  insulin lispro (ADMELOG) corrective regimen sliding scale   SubCutaneous every 6 hours  ketamine Infusion. 0.3 mG/kG/Hr IV Continuous <Continuous>  norepinephrine Infusion 0.05 MICROgram(s)/kG/Min IV Continuous <Continuous>  pantoprazole  Injectable 40 milliGRAM(s) IV Push daily  propofol Infusion 40 MICROgram(s)/kG/Min IV Continuous <Continuous>  senna Syrup 10 milliLiter(s) Oral daily  sodium chloride 0.9% lock flush 10 milliLiter(s) IV Push every 1 hour PRN    Drug Dosing Weight  Height (cm): 172.7 (08 Jan 2022 15:30)  Weight (kg): 88.4 (08 Jan 2022 15:30)  BMI (kg/m2): 29.6 (08 Jan 2022 15:30)  BSA (m2): 2.02 (08 Jan 2022 15:30)    CENTRAL LINE: [ ] YES [ ] NO  LOCATION:   DATE INSERTED:  REMOVE: [ ] YES [ ] NO  EXPLAIN:    HER: [ ] YES [ ] NO    DATE INSERTED:  REMOVE:  [ ] YES [ ] NO  EXPLAIN:    A-LINE:  [ ] YES [ ] NO  LOCATION:   DATE INSERTED:  REMOVE:  [ ] YES [ ] NO  EXPLAIN:    PMH -reviewed admission note, no change since admission      ABG - ( 13 Jan 2022 08:25 )  pH, Arterial: 7.20  pH, Blood: x     /  pCO2: 77    /  pO2: 94    / HCO3: 30    / Base Excess: -0.1  /  SaO2: 98                    01-12 @ 07:01  -  01-13 @ 07:00  --------------------------------------------------------  IN: 1442.4 mL / OUT: 1050 mL / NET: 392.4 mL        Mode: AC/ CMV (Assist Control/ Continuous Mandatory Ventilation)  RR (machine): 32  TV (machine): 450  FiO2: 100  PEEP: 12  ITime: 1  MAP: 23  PIP: 40      PHYSICAL EXAM:    GENERAL: NAD, well-groomed, well-developed  HEAD:  Atraumatic, Normocephalic  EYES: EOMI, PERRLA, conjunctiva and sclera clear  ENMT: No tonsillar erythema, exudates, or enlargement; Moist mucous membranes, Good dentition, [ ]No lesions  NECK: Supple, normal appearance, No JVD; Normal thyroid; Trachea midline  NERVOUS SYSTEM:  Alert & Oriented X3, Good concentration; Motor Strength 5/5 B/L upper and lower extremities; DTRs 2+ intact and symmetric  CHEST/LUNG: No chest deformity; Normal percussion bilaterally; No rales, rhonchi, wheezing   HEART: Regular rate and rhythm; No murmurs, rubs, or gallops  ABDOMEN: Soft, Nontender, Nondistended;Bowel sounds present  EXTREMITIES:  2+ Peripheral Pulses, No clubbing, cyanosis, or edema  LYMPH: No lymphadenopathy noted  SKIN: No rashes or lesions; Good capillary refill      LABS:  CBC Full  -  ( 13 Jan 2022 04:32 )  WBC Count : 10.62 K/uL  RBC Count : 5.44 M/uL  Hemoglobin : 16.1 g/dL  Hematocrit : 55.3 %  Platelet Count - Automated : 241 K/uL  Mean Cell Volume : 101.7 fl  Mean Cell Hemoglobin : 29.6 pg  Mean Cell Hemoglobin Concentration : 29.1 gm/dL  Auto Neutrophil # : 9.02 K/uL  Auto Lymphocyte # : 0.65 K/uL  Auto Monocyte # : 0.75 K/uL  Auto Eosinophil # : 0.03 K/uL  Auto Basophil # : 0.02 K/uL  Auto Neutrophil % : 84.9 %  Auto Lymphocyte % : 6.1 %  Auto Monocyte % : 7.1 %  Auto Eosinophil % : 0.3 %  Auto Basophil % : 0.2 %    01-12    147<H>  |  115<H>  |  40<H>  ----------------------------<  138<H>  5.0   |  33<H>  |  0.90    Ca    7.8<L>      12 Jan 2022 04:56  Phos  4.1     01-13  Mg     2.8     01-13    TPro  5.2<L>  /  Alb  1.5<L>  /  TBili  0.6  /  DBili  x   /  AST  20  /  ALT  18  /  AlkPhos  55  01-12        Culture Results:   No growth (01-10 @ 18:59)      RADIOLOGY & ADDITIONAL STUDIES REVIEWED:  ***    [ ]GOALS OF CARE DISCUSSION WITH PATIENT/FAMILY/PROXY:    CRITICAL CARE TIME SPENT: 35 minutes INTERVAL HPI/OVERNIGHT EVENTS: No acute events overnight. This morning of repeat chest xray. the ET tube was noted to too high, tube could not be advanced and pt required to be reintubated.     PRESSORS: [X] YES [ ] NO    Antimicrobial:    Cardiovascular:  norepinephrine Infusion 0.05 MICROgram(s)/kG/Min IV Continuous <Continuous>    Pulmonary:    Hematalogic:  enoxaparin Injectable 40 milliGRAM(s) SubCutaneous daily    Other:  chlorhexidine 0.12% Liquid 15 milliLiter(s) Oral Mucosa every 12 hours  chlorhexidine 2% Cloths 1 Application(s) Topical <User Schedule>  cisatracurium Infusion 3 MICROgram(s)/kG/Min IV Continuous <Continuous>  dexAMETHasone  Injectable 6 milliGRAM(s) IV Push daily  fentaNYL   Infusion. 2 MICROgram(s)/kG/Hr IV Continuous <Continuous>  insulin lispro (ADMELOG) corrective regimen sliding scale   SubCutaneous every 6 hours  ketamine Infusion. 0.3 mG/kG/Hr IV Continuous <Continuous>  pantoprazole  Injectable 40 milliGRAM(s) IV Push daily  propofol Infusion 40 MICROgram(s)/kG/Min IV Continuous <Continuous>  senna Syrup 10 milliLiter(s) Oral daily  sodium chloride 0.9% lock flush 10 milliLiter(s) IV Push every 1 hour PRN    chlorhexidine 0.12% Liquid 15 milliLiter(s) Oral Mucosa every 12 hours  chlorhexidine 2% Cloths 1 Application(s) Topical <User Schedule>  cisatracurium Infusion 3 MICROgram(s)/kG/Min IV Continuous <Continuous>  dexAMETHasone  Injectable 6 milliGRAM(s) IV Push daily  enoxaparin Injectable 40 milliGRAM(s) SubCutaneous daily  fentaNYL   Infusion. 2 MICROgram(s)/kG/Hr IV Continuous <Continuous>  insulin lispro (ADMELOG) corrective regimen sliding scale   SubCutaneous every 6 hours  ketamine Infusion. 0.3 mG/kG/Hr IV Continuous <Continuous>  norepinephrine Infusion 0.05 MICROgram(s)/kG/Min IV Continuous <Continuous>  pantoprazole  Injectable 40 milliGRAM(s) IV Push daily  propofol Infusion 40 MICROgram(s)/kG/Min IV Continuous <Continuous>  senna Syrup 10 milliLiter(s) Oral daily  sodium chloride 0.9% lock flush 10 milliLiter(s) IV Push every 1 hour PRN    Drug Dosing Weight  Height (cm): 172.7 (08 Jan 2022 15:30)  Weight (kg): 88.4 (08 Jan 2022 15:30)  BMI (kg/m2): 29.6 (08 Jan 2022 15:30)  BSA (m2): 2.02 (08 Jan 2022 15:30)    CENTRAL LINE: [X] YES [ ] NO  LOCATION:   DATE INSERTED:  REMOVE: [ ] YES [ ] NO  EXPLAIN:    HER: [X] YES [ ] NO    DATE INSERTED:  REMOVE:  [ ] YES [ ] NO  EXPLAIN:    A-LINE:  [X] YES [ ] NO  LOCATION:   DATE INSERTED:  REMOVE:  [ ] YES [ ] NO  EXPLAIN:    PMH -reviewed admission note, no change since admission      ABG - ( 13 Jan 2022 08:25 )  pH, Arterial: 7.20  pH, Blood: x     /  pCO2: 77    /  pO2: 94    / HCO3: 30    / Base Excess: -0.1  /  SaO2: 98                    01-12 @ 07:01  -  01-13 @ 07:00  --------------------------------------------------------  IN: 1442.4 mL / OUT: 1050 mL / NET: 392.4 mL        Mode: AC/ CMV (Assist Control/ Continuous Mandatory Ventilation)  RR (machine): 32  TV (machine): 450  FiO2: 100  PEEP: 12  ITime: 1  MAP: 23  PIP: 40      PHYSICAL EXAM:    GENERAL: Intubated and sedated.   HEAD:  Atraumatic, Normocephalic  EYES: Pupils constrict  ENMT: No tonsillar erythema, exudates, or enlargement; Moist mucous membranes, Good dentition,   NECK: Supple, normal appearance, No JVD; Normal thyroid; Trachea midline  NERVOUS SYSTEM:  Intubated and sedated.   CHEST/LUNG: Crackles b/l   HEART: Regular rate and rhythm; No murmurs, rubs, or gallops  ABDOMEN: Soft, Nontender, Nondistended;Bowel sounds present  EXTREMITIES:  2+ Peripheral Pulses, No clubbing, cyanosis, or edema  LYMPH: No lymphadenopathy noted  SKIN: No rashes or lesions; Good capillary refill      LABS:  CBC Full  -  ( 13 Jan 2022 04:32 )  WBC Count : 10.62 K/uL  RBC Count : 5.44 M/uL  Hemoglobin : 16.1 g/dL  Hematocrit : 55.3 %  Platelet Count - Automated : 241 K/uL  Mean Cell Volume : 101.7 fl  Mean Cell Hemoglobin : 29.6 pg  Mean Cell Hemoglobin Concentration : 29.1 gm/dL  Auto Neutrophil # : 9.02 K/uL  Auto Lymphocyte # : 0.65 K/uL  Auto Monocyte # : 0.75 K/uL  Auto Eosinophil # : 0.03 K/uL  Auto Basophil # : 0.02 K/uL  Auto Neutrophil % : 84.9 %  Auto Lymphocyte % : 6.1 %  Auto Monocyte % : 7.1 %  Auto Eosinophil % : 0.3 %  Auto Basophil % : 0.2 %    01-12    147<H>  |  115<H>  |  40<H>  ----------------------------<  138<H>  5.0   |  33<H>  |  0.90    Ca    7.8<L>      12 Jan 2022 04:56  Phos  4.1     01-13  Mg     2.8     01-13    TPro  5.2<L>  /  Alb  1.5<L>  /  TBili  0.6  /  DBili  x   /  AST  20  /  ALT  18  /  AlkPhos  55  01-12        Culture Results:   No growth (01-10 @ 18:59)      RADIOLOGY & ADDITIONAL STUDIES REVIEWED:  ***    [ ]GOALS OF CARE DISCUSSION WITH PATIENT/FAMILY/PROXY:    CRITICAL CARE TIME SPENT: 35 minutes

## 2022-01-14 NOTE — PROGRESS NOTE ADULT - SUBJECTIVE AND OBJECTIVE BOX
Time of Visit:  Patient seen and examined.     MEDICATIONS  (STANDING):  chlorhexidine 0.12% Liquid 15 milliLiter(s) Oral Mucosa every 12 hours  chlorhexidine 2% Cloths 1 Application(s) Topical <User Schedule>  dexAMETHasone  Injectable 6 milliGRAM(s) IV Push daily  enoxaparin Injectable 40 milliGRAM(s) SubCutaneous daily  fentaNYL   Infusion. 2 MICROgram(s)/kG/Hr (17.7 mL/Hr) IV Continuous <Continuous>  insulin lispro (ADMELOG) corrective regimen sliding scale   SubCutaneous every 6 hours  ketamine Infusion. 0.3 mG/kG/Hr (2.65 mL/Hr) IV Continuous <Continuous>  norepinephrine Infusion 0.1 MICROgram(s)/kG/Min (8.29 mL/Hr) IV Continuous <Continuous>  pantoprazole  Injectable 40 milliGRAM(s) IV Push daily  phenylephrine    Infusion 0.1 MICROgram(s)/kG/Min (1.66 mL/Hr) IV Continuous <Continuous>  propofol Infusion 40 MICROgram(s)/kG/Min (21.2 mL/Hr) IV Continuous <Continuous>  senna Syrup 10 milliLiter(s) Oral daily      MEDICATIONS  (PRN):  sodium chloride 0.9% lock flush 10 milliLiter(s) IV Push every 1 hour PRN Pre/post blood products, medications, blood draw, and to maintain line patency       Medications up to date at time of exam.      PHYSICAL EXAMINATION:  Patient has no new complaints.  GENERAL: The patient is a well-developed, well-nourished, in no apparent distress.     Vital Signs Last 24 Hrs  T(C): 37.5 (14 Jan 2022 10:19), Max: 38.1 (13 Jan 2022 13:30)  T(F): 99.5 (14 Jan 2022 10:19), Max: 100.6 (13 Jan 2022 13:30)  HR: 94 (14 Jan 2022 10:19) (86 - 122)  BP: 112/57 (14 Jan 2022 10:19) (64/40 - 139/58)  BP(mean): 69 (14 Jan 2022 10:19) (45 - 80)  RR: 32 (14 Jan 2022 10:19) (26 - 32)  SpO2: 76% (14 Jan 2022 10:19) (73% - 92%)  Mode: AC/ CMV (Assist Control/ Continuous Mandatory Ventilation)  RR (machine): 32  TV (machine): 450  FiO2: 100  PEEP: 12  ITime: 1  MAP: 22  PIP: 44   (if applicable)    Chest Tube (if applicable)    HEENT: Head is normocephalic and atraumatic. Extraocular muscles are intact. Mucous membranes are moist.     NECK: Supple, no palpable adenopathy.    LUNGS: Clear to auscultation, no wheezing, rales, or rhonchi.    HEART: Regular rate and rhythm without murmur.    ABDOMEN: Soft, nontender, and nondistended.  No hepatosplenomegaly is noted.    : No painful voiding, no pelvic pain    EXTREMITIES: Without any cyanosis, clubbing, rash, lesions or edema.    NEUROLOGIC: Awake, alert, oriented, grossly intact    SKIN: Warm, dry, good turgor.      LABS:                        17.9   15.82 )-----------( 320      ( 14 Jan 2022 05:49 )             61.5     01-14    142  |  110<H>  |  80<H>  ----------------------------<  228<H>  5.9<H>   |  30  |  2.67<H>    Ca    8.9      14 Jan 2022 05:49  Phos  5.8     01-14  Mg     3.3     01-14    TPro  6.0  /  Alb  1.5<L>  /  TBili  1.0  /  DBili  x   /  AST  38  /  ALT  27  /  AlkPhos  80  01-14        ABG - ( 14 Jan 2022 07:55 )  pH, Arterial: 7.01  pH, Blood: x     /  pCO2: 105   /  pO2: 42    / HCO3: 26    / Base Excess: -7.2  /  SaO2: 66                    D-Dimer Assay, Quantitative: 2107 ng/mL DDU (01-14-22 @ 05:49)        Procalcitonin, Serum: 0.37 ng/mL (01-13-22 @ 09:44)  Procalcitonin, Serum: 0.20 ng/mL (01-12-22 @ 09:38)      MICROBIOLOGY: (if applicable)    RADIOLOGY & ADDITIONAL STUDIES:  EKG:   CXR:  ECHO:    IMPRESSION: 81y Male PAST MEDICAL & SURGICAL HISTORY:  Diabetes mellitus    High cholesterol     p/w           RECOMMENDATIONS:

## 2022-01-14 NOTE — GOALS OF CARE CONVERSATION - ADVANCED CARE PLANNING - CONVERSATION DETAILS
Palliative care SW called pt's son Geovany ([phone]) using a  (ID: [id number]) to discuss burial resources. Pt's son shared that he does not live in the [place] and that he is in the area as his father is in critical condition. Pt's son requested a list of  homes in the area; palliative care emailed ([email or web address]) list of  homes to the pt's son. Palliative care SW provided anticipatory guidance in the context of picking up pt's items and payment. Pt's son agreed to reach out as needed.    Fitz Abreu  [phone]

## 2022-01-14 NOTE — CONSULT NOTE ADULT - CONVERSATION DETAILS
Spoke with patient's son Geovany Castaneda by phone given COVID visitation restrictions, [facility] discussion had in [place] per his request. Discussed patient actively dying, not maintaining oxygenation despite ventilatory support, worsening renal function. He expressed that he and his family are aware and are prepared that he may die, but they wish to continue whatever treatment may help his condition. He lives in [place] but is visiting here in [place]. Discussed risks/benefits of CPR if despite all aggressive treatments his heart stops and he dies which could be anytime, likely hours to days. He expressed that at that point, he is in agreement that he would not want CPR as they don't know what the neurologic consequences of CPR in his condition would mean and if there is no other treatment, then they would prefer he die naturally. Simi drafted for DNR. He wishes to continue all other treatments at this time. Support provided.

## 2022-01-14 NOTE — PROGRESS NOTE ADULT - ASSESSMENT
81M from home, AAOX3, self ambulating with PMH of DM and HLD comes to thr ED with c.o fever and cough since 8 days.  He was called for RRT in the main lobby for hypoxia - spo2 61% on Room air. Pt also endorses dry cough and increased generalized weakness.     In the ED,   Pt was started on the NRB 15L, Spo2 87% ---> started on BIPAP 16/8/100   CXR RLL infiltrate-> called for code sepsis   ABG- Respiratory acidosis     FULL CODE       ICU was consulted for Hypoxia 2/2 Pna       Assessment:    Acute Hypoxic Respiratory Failure   RLL Pna   DM   HLD   ÁNGEL     PLAN:    #CNS:  - Sedated    #CVS  / 75, hr 85  Pt has h/o of HLD ( not on any meds)  Trops- 105, EKG- RBBB  Troponin started to downtrend.     #Resp:  Acute Hypoxic Respiratory Failure:  Pt noted to be hypoxic 61% on RA--> Pt was started on the NRB 15L, Spo2 87% ---> started on BIPAP 16/8/100   COVID+,  CXR RLL infiltrate-> called for code sepsis   ABG- 7.32/70/75/36 -  Respiratory acidosis   Pt started on Remedesevir and decadron   - Pt intubated 1/10/22 as pts continued to desat.   - Continue to prone pt.  f/u Covid Inf markers  f/u Bld cx and sputum cx     #Renal:  ÁNGEL:  Pt noted to have BUN/ Cr 36/ 1.45   eGFR- 45  Pt denies any renal disorders   Likely ÁNGEL 2/2 dehydration   - Resolved.       #ENDO:  Pt has PMH of DM   takes Metformin 500mg BD at home, not on insulin   RBS- 198   C/W HSS   A1C - 7.3      #ID:  Mild Leucocytosis - 12k   Likely 2/2 pna   c/w Azithro and ceftriaxone  f/u bld cx, sputum cx     #GI-  No issues   On DASH diet   To be kept NPO when on BIPAP     #MSK/SKIN   no issues     #PPX   PPI  Heparin    GOC  FULL CODE     DISPO:  Pt to be moved to ICU for further care        81M from home, AAOX3, self ambulating with PMH of DM and HLD comes to thr ED with c.o fever and cough since 8 days.  He was called for RRT in the main lobby for hypoxia - spo2 61% on Room air. Pt also endorses dry cough and increased generalized weakness.     In the ED,   Pt was started on the NRB 15L, Spo2 87% ---> started on BIPAP 16/8/100   CXR RLL infiltrate-> called for code sepsis   ABG- Respiratory acidosis     FULL CODE       ICU was consulted for Hypoxia 2/2 Pna       Assessment:    Acute Hypoxic Respiratory Failure   RLL Pna   DM   HLD   ÁNGEL     PLAN:    #CNS:  - Sedated    #CVS  / 75, hr 85  Pt has h/o of HLD ( not on any meds)  Trops- 105, EKG- RBBB  Troponin started to downtrend.     #Resp:  Acute Hypoxic Respiratory Failure:  Pt noted to be hypoxic 61% on RA--> Pt was started on the NRB 15L, Spo2 87% ---> started on BIPAP 16/8/100   COVID+,  CXR RLL infiltrate-> called for code sepsis   ABG 7.01|105|42|26  Pt started on Remedesevir and decadron   - Pt intubated 1/10/22 as pts continued to desat.   - Continue to prone pt.  f/u Covid Inf markers  f/u Bld cx and sputum cx     #Renal:  ÁNGEL:  Pt noted to have BUN/ Cr 36/ 1.45   eGFR- 45  Pt denies any renal disorders   Likely ÁNGEL 2/2 dehydration   Cr. 2.67 - Worsening   - NO urine outpt in the last 24 hours.   - Given 1 dose of lasix and has been net positive 6232        #ENDO:  Pt has PMH of DM   takes Metformin 500mg BD at home, not on insulin   RBS- 198   C/W HSS   A1C - 7.3      #ID:  Mild Leucocytosis - 12k   Likely 2/2 pna   c/w Azithro and ceftriaxone  f/u bld cx, sputum cx     #GI-  No issues   On DASH diet   To be kept NPO when on BIPAP     #MSK/SKIN   no issues     #PPX   PPI  Heparin    GOC  FULL CODE     DISPO:  Pt to be moved to ICU for further care        81M from home, AAOX3, self ambulating with PMH of DM and HLD comes to thr ED with c.o fever and cough since 8 days.  He was called for RRT in the main lobby for hypoxia - spo2 61% on Room air. Pt also endorses dry cough and increased generalized weakness.     In the ED,   Pt was started on the NRB 15L, Spo2 87% ---> started on BIPAP 16/8/100   CXR RLL infiltrate-> called for code sepsis   ABG- Respiratory acidosis     FULL CODE       ICU was consulted for Hypoxia 2/2 Pna       Assessment:    Acute Hypoxic Respiratory Failure   RLL Pna   DM   HLD   ÁNGEL     PLAN:    #CNS:  - Sedated    #CVS  / 75, hr 85  Pt has h/o of HLD ( not on any meds)  Trops- 105, EKG- RBBB  Troponin started to downtrend.     #Resp:  Acute Hypoxic Respiratory Failure:  Pt noted to be hypoxic 61% on RA--> Pt was started on the NRB 15L, Spo2 87% ---> started on BIPAP 16/8/100   COVID+,  CXR RLL infiltrate-> called for code sepsis   ABG 7.01|105|42|26  Pt started on Remedesevir and decadron   - Pt intubated 1/10/22 as pts continued to desat.   - No more prone as it has not shown any improvement.   f/u Covid Inf markers  f/u Bld cx and sputum cx     #Renal:  ÁNGEL:  Pt noted to have BUN/ Cr 36/ 1.45   eGFR- 45  Pt denies any renal disorders   Likely ÁNGEL 2/2 dehydration   Cr. 2.67 - Worsening   - NO urine outpt in the last 24 hours.   - Given 1 dose of lasix and has been net positive 7237        #ENDO:  Pt has PMH of DM   takes Metformin 500mg BD at home, not on insulin   RBS- 198   C/W HSS   A1C - 7.3      #ID:  Mild Leucocytosis - 12k   Likely 2/2 pna   c/w Azithro and ceftriaxone  f/u bld cx, sputum cx     #GI-  No issues   On DASH diet   To be kept NPO when on BIPAP     #MSK/SKIN   no issues     #PPX   PPI  Heparin    GOC  FULL CODE     DISPO:  Pt to be moved to ICU for further care

## 2022-01-14 NOTE — PROGRESS NOTE ADULT - NUTRITIONAL ASSESSMENT
Diet, NPO (01-09-22 @ 17:50) [Active]

## 2022-01-14 NOTE — CONSULT NOTE ADULT - CONSULT REASON
AHRF
acute hypoxic resp failure, COVID PNA, MOF, GOC
acute respiratory failure due to COVID, intubated and in septic shock, dying, son wants full code, GOC

## 2022-01-14 NOTE — DISCHARGE NOTE FOR THE EXPIRED PATIENT - HOSPITAL COURSE
81M from home, AAOX3, self ambulating with PMH of DM and HLD comes to thr ED with c.o fever and cough since 8 days.  He was called for RRT in the main lobby for hypoxia - spo2 61% on Room air. Pt also endorses dry cough and increased generalized weakness. Admitted to ICU for Acute hypoxic respiratory failure secondary to COVID-19 PNA requiring intubation.     “Called by nurse about patient being unresponsive and in asystole. Patient seen at bedside. On examination, patient was unresponsive, bilateral pupils dilated, non-responsive to light, no bilateral conjunctival reflex present, no heart sounds auscultated, no spontaneous breath sounds present, no peripheral pulses present. EKG showed aystole. Patient pronounced dead at 20:15 hours on 1/14/2022. Attending, Dr. Berkowitz, informed regarding patient’s status. No family present at bedside. Dr. Martines informed patient’s son, Geovany and condolences were offered.”

## 2022-01-14 NOTE — PROGRESS NOTE ADULT - SUBJECTIVE AND OBJECTIVE BOX
INTERVAL HPI/OVERNIGHT EVENTS: ***    PRESSORS: [ ] YES [ ] NO    Antimicrobial:    Cardiovascular:  norepinephrine Infusion 0.1 MICROgram(s)/kG/Min IV Continuous <Continuous>  phenylephrine    Infusion 0.1 MICROgram(s)/kG/Min IV Continuous <Continuous>    Pulmonary:    Hematalogic:  enoxaparin Injectable 40 milliGRAM(s) SubCutaneous daily    Other:  chlorhexidine 0.12% Liquid 15 milliLiter(s) Oral Mucosa every 12 hours  chlorhexidine 2% Cloths 1 Application(s) Topical <User Schedule>  dexAMETHasone  Injectable 6 milliGRAM(s) IV Push daily  fentaNYL   Infusion. 2 MICROgram(s)/kG/Hr IV Continuous <Continuous>  insulin lispro (ADMELOG) corrective regimen sliding scale   SubCutaneous every 6 hours  ketamine Infusion. 0.3 mG/kG/Hr IV Continuous <Continuous>  pantoprazole  Injectable 40 milliGRAM(s) IV Push daily  propofol Infusion 40 MICROgram(s)/kG/Min IV Continuous <Continuous>  senna Syrup 10 milliLiter(s) Oral daily  sodium chloride 0.9% lock flush 10 milliLiter(s) IV Push every 1 hour PRN    chlorhexidine 0.12% Liquid 15 milliLiter(s) Oral Mucosa every 12 hours  chlorhexidine 2% Cloths 1 Application(s) Topical <User Schedule>  dexAMETHasone  Injectable 6 milliGRAM(s) IV Push daily  enoxaparin Injectable 40 milliGRAM(s) SubCutaneous daily  fentaNYL   Infusion. 2 MICROgram(s)/kG/Hr IV Continuous <Continuous>  insulin lispro (ADMELOG) corrective regimen sliding scale   SubCutaneous every 6 hours  ketamine Infusion. 0.3 mG/kG/Hr IV Continuous <Continuous>  norepinephrine Infusion 0.1 MICROgram(s)/kG/Min IV Continuous <Continuous>  pantoprazole  Injectable 40 milliGRAM(s) IV Push daily  phenylephrine    Infusion 0.1 MICROgram(s)/kG/Min IV Continuous <Continuous>  propofol Infusion 40 MICROgram(s)/kG/Min IV Continuous <Continuous>  senna Syrup 10 milliLiter(s) Oral daily  sodium chloride 0.9% lock flush 10 milliLiter(s) IV Push every 1 hour PRN    Drug Dosing Weight  Height (cm): 172.7 (08 Jan 2022 15:30)  Weight (kg): 88.4 (08 Jan 2022 15:30)  BMI (kg/m2): 29.6 (08 Jan 2022 15:30)  BSA (m2): 2.02 (08 Jan 2022 15:30)    CENTRAL LINE: [ ] YES [ ] NO  LOCATION:   DATE INSERTED:  REMOVE: [ ] YES [ ] NO  EXPLAIN:    HER: [ ] YES [ ] NO    DATE INSERTED:  REMOVE:  [ ] YES [ ] NO  EXPLAIN:    A-LINE:  [ ] YES [ ] NO  LOCATION:   DATE INSERTED:  REMOVE:  [ ] YES [ ] NO  EXPLAIN:    PMH -reviewed admission note, no change since admission      ABG - ( 14 Jan 2022 07:55 )  pH, Arterial: 7.01  pH, Blood: x     /  pCO2: 105   /  pO2: 42    / HCO3: 26    / Base Excess: -7.2  /  SaO2: 66                    01-13 @ 07:01  -  01-14 @ 07:00  --------------------------------------------------------  IN: 2799 mL / OUT: 370 mL / NET: 2429 mL        Mode: AC/ CMV (Assist Control/ Continuous Mandatory Ventilation)  RR (machine): 32  TV (machine): 450  FiO2: 100  PEEP: 12  ITime: 1  MAP: 22  PIP: 44      PHYSICAL EXAM:    GENERAL: NAD, well-groomed, well-developed  HEAD:  Atraumatic, Normocephalic  EYES: EOMI, PERRLA, conjunctiva and sclera clear  ENMT: No tonsillar erythema, exudates, or enlargement; Moist mucous membranes, Good dentition, [ ]No lesions  NECK: Supple, normal appearance, No JVD; Normal thyroid; Trachea midline  NERVOUS SYSTEM:  Alert & Oriented X3, Good concentration; Motor Strength 5/5 B/L upper and lower extremities; DTRs 2+ intact and symmetric  CHEST/LUNG: No chest deformity; Normal percussion bilaterally; No rales, rhonchi, wheezing   HEART: Regular rate and rhythm; No murmurs, rubs, or gallops  ABDOMEN: Soft, Nontender, Nondistended;Bowel sounds present  EXTREMITIES:  2+ Peripheral Pulses, No clubbing, cyanosis, or edema  LYMPH: No lymphadenopathy noted  SKIN: No rashes or lesions; Good capillary refill      LABS:  CBC Full  -  ( 14 Jan 2022 05:49 )  WBC Count : 15.82 K/uL  RBC Count : 5.91 M/uL  Hemoglobin : 17.9 g/dL  Hematocrit : 61.5 %  Platelet Count - Automated : 320 K/uL  Mean Cell Volume : 104.1 fl  Mean Cell Hemoglobin : 30.3 pg  Mean Cell Hemoglobin Concentration : 29.1 gm/dL  Auto Neutrophil # : 13.14 K/uL  Auto Lymphocyte # : 0.93 K/uL  Auto Monocyte # : 1.16 K/uL  Auto Eosinophil # : 0.02 K/uL  Auto Basophil # : 0.05 K/uL  Auto Neutrophil % : 83.1 %  Auto Lymphocyte % : 5.9 %  Auto Monocyte % : 7.3 %  Auto Eosinophil % : 0.1 %  Auto Basophil % : 0.3 %    01-14    142  |  110<H>  |  80<H>  ----------------------------<  228<H>  5.9<H>   |  30  |  2.67<H>    Ca    8.9      14 Jan 2022 05:49  Phos  5.8     01-14  Mg     3.3     01-14    TPro  6.0  /  Alb  1.5<L>  /  TBili  1.0  /  DBili  x   /  AST  38  /  ALT  27  /  AlkPhos  80  01-14            RADIOLOGY & ADDITIONAL STUDIES REVIEWED:  ***    [ ]GOALS OF CARE DISCUSSION WITH PATIENT/FAMILY/PROXY:    CRITICAL CARE TIME SPENT: 35 minutes INTERVAL HPI/OVERNIGHT EVENTS: Pt continued to get worse overnight. His saturations overnight never came over 85%. Pts Kidney function has worsened. Pts family was made aware. Continues to require pressor support.     PRESSORS: [X] YES [] NO    Antimicrobial:    Cardiovascular:  norepinephrine Infusion 0.1 MICROgram(s)/kG/Min IV Continuous <Continuous>  phenylephrine    Infusion 0.1 MICROgram(s)/kG/Min IV Continuous <Continuous>    Pulmonary:    Hematalogic:  enoxaparin Injectable 40 milliGRAM(s) SubCutaneous daily    Other:  chlorhexidine 0.12% Liquid 15 milliLiter(s) Oral Mucosa every 12 hours  chlorhexidine 2% Cloths 1 Application(s) Topical <User Schedule>  dexAMETHasone  Injectable 6 milliGRAM(s) IV Push daily  fentaNYL   Infusion. 2 MICROgram(s)/kG/Hr IV Continuous <Continuous>  insulin lispro (ADMELOG) corrective regimen sliding scale   SubCutaneous every 6 hours  ketamine Infusion. 0.3 mG/kG/Hr IV Continuous <Continuous>  pantoprazole  Injectable 40 milliGRAM(s) IV Push daily  propofol Infusion 40 MICROgram(s)/kG/Min IV Continuous <Continuous>  senna Syrup 10 milliLiter(s) Oral daily  sodium chloride 0.9% lock flush 10 milliLiter(s) IV Push every 1 hour PRN    chlorhexidine 0.12% Liquid 15 milliLiter(s) Oral Mucosa every 12 hours  chlorhexidine 2% Cloths 1 Application(s) Topical <User Schedule>  dexAMETHasone  Injectable 6 milliGRAM(s) IV Push daily  enoxaparin Injectable 40 milliGRAM(s) SubCutaneous daily  fentaNYL   Infusion. 2 MICROgram(s)/kG/Hr IV Continuous <Continuous>  insulin lispro (ADMELOG) corrective regimen sliding scale   SubCutaneous every 6 hours  ketamine Infusion. 0.3 mG/kG/Hr IV Continuous <Continuous>  norepinephrine Infusion 0.1 MICROgram(s)/kG/Min IV Continuous <Continuous>  pantoprazole  Injectable 40 milliGRAM(s) IV Push daily  phenylephrine    Infusion 0.1 MICROgram(s)/kG/Min IV Continuous <Continuous>  propofol Infusion 40 MICROgram(s)/kG/Min IV Continuous <Continuous>  senna Syrup 10 milliLiter(s) Oral daily  sodium chloride 0.9% lock flush 10 milliLiter(s) IV Push every 1 hour PRN    Drug Dosing Weight  Height (cm): 172.7 (08 Jan 2022 15:30)  Weight (kg): 88.4 (08 Jan 2022 15:30)  BMI (kg/m2): 29.6 (08 Jan 2022 15:30)  BSA (m2): 2.02 (08 Jan 2022 15:30)    CENTRAL LINE: [X] YES [ ] NO  LOCATION:   DATE INSERTED:  REMOVE: [ ] YES [ ] NO  EXPLAIN:    HER: [X] YES [ ] NO    DATE INSERTED:  REMOVE:  [ ] YES [ ] NO  EXPLAIN:    A-LINE:  [X] YES [ ] NO  LOCATION:   DATE INSERTED:  REMOVE:  [ ] YES [ ] NO  EXPLAIN:    PMH -reviewed admission note, no change since admission      ABG - ( 14 Jan 2022 07:55 )  pH, Arterial: 7.01  pH, Blood: x     /  pCO2: 105   /  pO2: 42    / HCO3: 26    / Base Excess: -7.2  /  SaO2: 66                    01-13 @ 07:01  -  01-14 @ 07:00  --------------------------------------------------------  IN: 2799 mL / OUT: 370 mL / NET: 2429 mL        Mode: AC/ CMV (Assist Control/ Continuous Mandatory Ventilation)  RR (machine): 32  TV (machine): 450  FiO2: 100  PEEP: 12  ITime: 1  MAP: 22  PIP: 44    PHYSICAL EXAM:    GENERAL: Intubated and sedated.   HEAD:  Atraumatic, Normocephalic  EYES: Pupils constrict  ENMT: No tonsillar erythema, exudates, or enlargement; Moist mucous membranes, Good dentition,   NECK: Supple, normal appearance, No JVD; Normal thyroid; Trachea midline  NERVOUS SYSTEM:  Intubated and sedated.   CHEST/LUNG: Crackles b/l   HEART: Regular rate and rhythm; No murmurs, rubs, or gallops  ABDOMEN: Soft, Nontender, Nondistended;Bowel sounds present  EXTREMITIES:  2+ Peripheral Pulses, No clubbing, cyanosis, or edema  LYMPH: No lymphadenopathy noted  SKIN: No rashes or lesions; Good capillary refillrefill      LABS:  CBC Full  -  ( 14 Jan 2022 05:49 )  WBC Count : 15.82 K/uL  RBC Count : 5.91 M/uL  Hemoglobin : 17.9 g/dL  Hematocrit : 61.5 %  Platelet Count - Automated : 320 K/uL  Mean Cell Volume : 104.1 fl  Mean Cell Hemoglobin : 30.3 pg  Mean Cell Hemoglobin Concentration : 29.1 gm/dL  Auto Neutrophil # : 13.14 K/uL  Auto Lymphocyte # : 0.93 K/uL  Auto Monocyte # : 1.16 K/uL  Auto Eosinophil # : 0.02 K/uL  Auto Basophil # : 0.05 K/uL  Auto Neutrophil % : 83.1 %  Auto Lymphocyte % : 5.9 %  Auto Monocyte % : 7.3 %  Auto Eosinophil % : 0.1 %  Auto Basophil % : 0.3 %    01-14    142  |  110<H>  |  80<H>  ----------------------------<  228<H>  5.9<H>   |  30  |  2.67<H>    Ca    8.9      14 Jan 2022 05:49  Phos  5.8     01-14  Mg     3.3     01-14    TPro  6.0  /  Alb  1.5<L>  /  TBili  1.0  /  DBili  x   /  AST  38  /  ALT  27  /  AlkPhos  80  01-14            RADIOLOGY & ADDITIONAL STUDIES REVIEWED:  ***    [ ]GOALS OF CARE DISCUSSION WITH PATIENT/FAMILY/PROXY:    CRITICAL CARE TIME SPENT: 35 minutes

## 2022-01-14 NOTE — PROGRESS NOTE ADULT - PROVIDER SPECIALTY LIST ADULT
Critical Care
Pulmonology
Critical Care
Pulmonology
Critical Care

## 2022-01-14 NOTE — CONSULT NOTE ADULT - SUBJECTIVE AND OBJECTIVE BOX
HPI:  81M from home, AAOX3, self ambulating with PMH of DM and HLD comes to thr ED with c.o fever and cough since 8 days. Pt appears tired, but AAOX3. Obtaining history is limited as pt is on BIPAP. Pt mentions having worsening shortness of breath today and visited the ED. He was called for RRT in the main lobby for hypoxia - spo2 61% on Room air. Pt also endorses dry cough and increased generalized weakness.   Pt denies any sick contact and stays with grand child. Pt is vaccinated with J&J.     In the ED,   Pt was started on the NRB 15L, Spo2 87% ---> started on BIPAP 16/8/100   CXR RLL infiltrate-> called for code sepsis   COVID+   ABG- Respiratory acidosis  (08 Jan 2022 12:46)      PAST MEDICAL & SURGICAL HISTORY:  Diabetes mellitus    High cholesterol        SOCIAL HISTORY:    Admitted from:  home assisted living [facility]   Substance abuse history:              Tobacco hx:                  Alcohol hx:              Home Opioid hx:  Amish:                                    Preferred Language:    Surrogate/HCP/Guardian:            Phone#:    FAMILY HISTORY:    Baseline ADLs (prior to admission):    Allergies    No Known Allergies    Intolerances      Present Symptoms:   Dyspnea: denies  Nausea/Vomiting: denies  Anxiety: denies  Depressed denies  Fatigue: denies  Loss of appetite: denies  Pain:            denies                    location:    Constipation/diarrhea: denies        Review of Systems: [All others negative or Unable to obtain due to poor mentation]    MEDICATIONS  (STANDING):  chlorhexidine 0.12% Liquid 15 milliLiter(s) Oral Mucosa every 12 hours  chlorhexidine 2% Cloths 1 Application(s) Topical <User Schedule>  dexAMETHasone  Injectable 6 milliGRAM(s) IV Push daily  enoxaparin Injectable 40 milliGRAM(s) SubCutaneous daily  fentaNYL   Infusion. 2 MICROgram(s)/kG/Hr (17.7 mL/Hr) IV Continuous <Continuous>  insulin lispro (ADMELOG) corrective regimen sliding scale   SubCutaneous every 6 hours  ketamine Infusion. 0.3 mG/kG/Hr (2.65 mL/Hr) IV Continuous <Continuous>  norepinephrine Infusion 0.1 MICROgram(s)/kG/Min (8.29 mL/Hr) IV Continuous <Continuous>  pantoprazole  Injectable 40 milliGRAM(s) IV Push daily  phenylephrine    Infusion 0.1 MICROgram(s)/kG/Min (1.66 mL/Hr) IV Continuous <Continuous>  propofol Infusion 40 MICROgram(s)/kG/Min (21.2 mL/Hr) IV Continuous <Continuous>  senna Syrup 10 milliLiter(s) Oral daily    MEDICATIONS  (PRN):  sodium chloride 0.9% lock flush 10 milliLiter(s) IV Push every 1 hour PRN Pre/post blood products, medications, blood draw, and to maintain line patency      PHYSICAL EXAM:    Vital Signs Last 24 Hrs  T(C): 37.4 (14 Jan 2022 13:00), Max: 38.1 (13 Jan 2022 14:30)  T(F): 99.3 (14 Jan 2022 13:00), Max: 100.6 (13 Jan 2022 14:30)  HR: 114 (14 Jan 2022 13:00) (86 - 123)  BP: 98/58 (14 Jan 2022 13:00) (64/40 - 139/58)  BP(mean): 68 (14 Jan 2022 13:00) (45 - 80)  RR: 29 (14 Jan 2022 13:00) (26 - 32)  SpO2: 74% (14 Jan 2022 13:00) (73% - 92%)    General: alert  oriented x ____    [lethargic distressed cachexia  nonverbal  unarousable verbal]  Karnofsky Performance Score/Palliative Performance Status Version2:     %    HEENT: normal  dry mouth  ET tube/trach oral lesions:  Lungs: comfortable tachypnea/labored breathing  excessive secretions  CV: normal  tachycardia  GI: normal  distended  tender  incontinent               PEG/NG/OG tube  constipation  last BM:   : normal  incontinent  oliguria/anuria  taylor  Musculoskeletal: normal  weakness  edema             ambulatory  bedbound/wheelchair bound  Skin: normal  pressure ulcers: stage: edema: other:  Neuro: no deficits cognitive impairment dsyphagia/dysarthria paresis: other:  Oral intake ability: unable/only mouth care [minimal moderate full capability]  Diet: [NPO]    LABS:                        17.9   15.82 )-----------( 320      ( 14 Jan 2022 05:49 )             61.5     01-14    142  |  110<H>  |  80<H>  ----------------------------<  228<H>  5.9<H>   |  30  |  2.67<H>    Ca    8.9      14 Jan 2022 05:49  Phos  5.8     01-14  Mg     3.3     01-14    TPro  6.0  /  Alb  1.5<L>  /  TBili  1.0  /  DBili  x   /  AST  38  /  ALT  27  /  AlkPhos  80  01-14        RADIOLOGY & ADDITIONAL STUDIES:    ADVANCE DIRECTIVES:    HPI:  81M from home, AAOX3, self ambulating with PMH of DM and HLD comes to thr ED with c.o fever and cough since 8 days. Pt appears tired, but AAOX3. Obtaining history is limited as pt is on BIPAP. Pt mentions having worsening shortness of breath today and visited the ED. He was called for RRT in the main lobby for hypoxia - spo2 61% on Room air. Pt also endorses dry cough and increased generalized weakness.   Pt denies any sick contact and stays with grand child. Pt is vaccinated with J&J.     In the ED,   Pt was started on the NRB 15L, Spo2 87% ---> started on BIPAP 16/8/100   CXR RLL infiltrate-> called for code sepsis   COVID+   ABG- Respiratory acidosis  (08 Jan 2022 12:46)    Interval history:  not oxygenating well on ventilator. Worsening renal function. Full code on file.        PAST MEDICAL & SURGICAL HISTORY:  Diabetes mellitus    High cholesterol        SOCIAL HISTORY: has children   Admitted from:  [facility]        Surrogate/HCP/Guardian:    Geovany nation        Phone#: [phone]    FAMILY HISTORY:    Baseline ADLs (prior to admission): alert, min ambulatory    Allergies    No Known Allergies    Intolerances      Present Symptoms:            Review of Systems:   Unable to obtain due to poor mentation     MEDICATIONS  (STANDING):  chlorhexidine 0.12% Liquid 15 milliLiter(s) Oral Mucosa every 12 hours  chlorhexidine 2% Cloths 1 Application(s) Topical <User Schedule>  dexAMETHasone  Injectable 6 milliGRAM(s) IV Push daily  enoxaparin Injectable 40 milliGRAM(s) SubCutaneous daily  fentaNYL   Infusion. 2 MICROgram(s)/kG/Hr (17.7 mL/Hr) IV Continuous <Continuous>  insulin lispro (ADMELOG) corrective regimen sliding scale   SubCutaneous every 6 hours  ketamine Infusion. 0.3 mG/kG/Hr (2.65 mL/Hr) IV Continuous <Continuous>  norepinephrine Infusion 0.1 MICROgram(s)/kG/Min (8.29 mL/Hr) IV Continuous <Continuous>  pantoprazole  Injectable 40 milliGRAM(s) IV Push daily  phenylephrine    Infusion 0.1 MICROgram(s)/kG/Min (1.66 mL/Hr) IV Continuous <Continuous>  propofol Infusion 40 MICROgram(s)/kG/Min (21.2 mL/Hr) IV Continuous <Continuous>  senna Syrup 10 milliLiter(s) Oral daily    MEDICATIONS  (PRN):  sodium chloride 0.9% lock flush 10 milliLiter(s) IV Push every 1 hour PRN Pre/post blood products, medications, blood draw, and to maintain line patency      PHYSICAL EXAM:    Vital Signs Last 24 Hrs  T(C): 37.4 (14 Jan 2022 13:00), Max: 38.1 (13 Jan 2022 14:30)  T(F): 99.3 (14 Jan 2022 13:00), Max: 100.6 (13 Jan 2022 14:30)  HR: 114 (14 Jan 2022 13:00) (86 - 123)  BP: 98/58 (14 Jan 2022 13:00) (64/40 - 139/58)  BP(mean): 68 (14 Jan 2022 13:00) (45 - 80)  RR: 29 (14 Jan 2022 13:00) (26 - 32)  SpO2: 74% (14 Jan 2022 13:00) (73% - 92%)     Karnofsky Performance Score/Palliative Performance Status Version2: 10    %  Not fully examined due to COVID status       LABS:                        17.9   15.82 )-----------( 320      ( 14 Jan 2022 05:49 )             61.5     01-14    142  |  110<H>  |  80<H>  ----------------------------<  228<H>  5.9<H>   |  30  |  2.67<H>    Ca    8.9      14 Jan 2022 05:49  Phos  5.8     01-14  Mg     3.3     01-14    TPro  6.0  /  Alb  1.5<L>  /  TBili  1.0  /  DBili  x   /  AST  38  /  ALT  27  /  AlkPhos  80  01-14        RADIOLOGY & ADDITIONAL STUDIES:  < from: Xray Chest 1 View- PORTABLE-Routine (Xray Chest 1 View- PORTABLE-Routine in AM.) (01.13.22 @ 09:59) >  ACC: [id number] EXAM:  XR CHEST PORTABLE ROUTINE 1V                          PROCEDURE DATE:  01/13/2022          INTERPRETATION:  Chest one view    HISTORY: Hypoxia    COMPARISON STUDY: 1/12/2022    Frontal expiratory view of the chest shows the heart to be normal in   size. Endotracheal tube, right jugular line and feeding tube are present   although the feeding tube tip is not included. The feeding tube loops   over the lower neck either in the patient's throat or externally.    The lungs showsimilar patchy infiltrates of the lower right lung and   left base. There is no evidence of pneumothorax nor definite pleural   effusion.    IMPRESSION:  Similar infiltrates. Feeding tube as noted.      Thank you for the courtesy of this referral.    --- End of Report ---            DEMETRI THIBODEAUX MD; Attending Interventional Radiologist  This document has been electronically signed. Jan 13 2022  4:23PM    < end of copied text >      ADVANCE DIRECTIVES: MOLST for DNR/DNI

## 2022-01-14 NOTE — CONSULT NOTE ADULT - PROBLEM SELECTOR RECOMMENDATION 3
[facility] discussion as above. Poor overall prognosis, appears to be actively dying. Now DNR, continue all other medical treatments at this time. Palliative will follow.

## 2022-01-14 NOTE — PROGRESS NOTE ADULT - ATTENDING COMMENTS
81M from home, AAOX3, self ambulating with PMH of DM and HLD comes to thr ED with c.o fever and cough since 8 days. Found to have COVID19   ICU was consulted for Hypoxia 2/2 Pna       Assessment:  - Acute Hypoxic Hypercapnic resp  Respiratory Failure   - Bilateral viral pneumonia    - Covid-19 infection   - AMS / Encephalopathy   - DM type 2  - HLD   - ÁNGEL    Plan   - Monitor neurologic status  - Continue BiPaP alternate with HFNC as tolerated   - Isolation : contact and airborne   - Remdesivir daily x 5 days   - Decadron 6 mg /day x  - Tocilizumab infusion as no obvious signs of bacterial infection  - Monitor biomarkers daily   - Monitor blood sugar with insulin coverage  - Asp precaution   - NPO while on Bipap  - Monitor renal fx   - DVT GI prophy  - Prognosis is guarded
81M from home, AAOX3, self ambulating with PMH of DM and HLD comes to thr ED with c.o fever and cough since 8 days. Found to have COVID19   ICU was consulted for Hypoxia 2/2 Pna       Assessment:  - Acute Hypoxic Hypercapnic resp  Respiratory Failure   - Bilateral viral pneumonia    - Covid-19 infection   - AMS / Encephalopathy   - DM type 2  - HLD   - ÁNGEL    Plan   - Intubated and placed on mechanical ventilation  - On sedation and paralytics   - Plan for proning today   - Isolation : contact and airborne   - Remdesivir daily x 5 days   - Decadron 6 mg /day x  - Monitor biomarkers daily   - Monitor blood sugar with insulin coverage  - Asp precaution   - NPO while proned  - Monitor renal fx   - DVT GI prophy  - Prognosis is guarded
81M from home, AAOX3, self ambulating with PMH of DM and HLD comes to thr ED with c.o fever and cough since 8 days. Found to have COVID19   ICU was consulted for Hypoxia 2/2 Pna       Assessment:  - Acute Hypoxic Hypercapnic resp  Respiratory Failure   - Bilateral viral pneumonia    - Covid-19 infection   - AMS / Encephalopathy   - DM type 2  - HLD   - ÁNGEL    Plan   - Intubated and placed on mechanical ventilation  - On sedation and paralytics   - Cont. Prone positioning as noted with improvement   - Isolation : contact and airborne   - Remdesivir daily x 5 days   - Decadron 6 mg /day x  - Monitor biomarkers daily   - Monitor blood glucose with insulin coverage  - Asp precaution   - Monitor renal fx   - DVT GI prophy  - Prognosis is guarded
81M from home, AAOX3, self ambulating with PMH of DM and HLD comes to thr ED with c.o fever and cough since 8 days. Found to have COVID19   ICU was consulted for Hypoxia 2/2 Pna       Assessment:  - Acute Hypoxic Hypercapnic resp  Respiratory Failure   - Bilateral viral pneumonia    - Covid-19 infection   - AMS / Encephalopathy   - DM type 2  - HLD   - ÁNGEL    Plan   - Intubated and placed on mechanical ventilation  - ABG worsening, though unable to further adjust vent   - On sedation   - D/c paralytics  - Too unstable for prone positioning any further  - Isolation : contact and airborne   - Remdesivir daily x 5 days   - Decadron 6 mg /day x  - Monitor biomarkers daily   - Monitor blood glucose with insulin coverage  - Asp precaution   - Monitor renal fx   - DVT GI prophy  - Prognosis is poor  - Palliative care follow up   - DNR code status
81M from home, AAOX3, self ambulating with PMH of DM and HLD comes to thr ED with c.o fever and cough since 8 days. Found to have COVID19   ICU was consulted for Hypoxia 2/2 Pna       Assessment:  - Acute Hypoxic Hypercapnic resp  Respiratory Failure   - Bilateral viral pneumonia    - Covid-19 infection   - AMS / Encephalopathy   - DM type 2  - HLD   - ÁNGEL    Plan   - Intubated and placed on mechanical ventilation  - On sedation and paralytics   - Cont. Prone positioning   - Isolation : contact and airborne   - Remdesivir daily x 5 days   - Decadron 6 mg /day x  - Monitor biomarkers daily   - Monitor blood glucose with insulin coverage  - Asp precaution   - Monitor renal fx   - DVT GI prophy  - Prognosis is guarded
81M from home, AAOX3, self ambulating with PMH of DM and HLD comes to thr ED with c.o fever and cough since 8 days.  He was called for RRT in the main lobby for hypoxia - spo2 61% on Room air. Pt also endorses dry cough and increased generalized weakness.     In the ED,   Pt was started on the NRB 15L, Spo2 87% ---> started on BIPAP 16/8/100   CXR RLL infiltrate-> called for code sepsis   ABG- Respiratory acidosis     FULL CODE       ICU was consulted for Hypoxia 2/2 Pna       Assessment:    - Acute Hypoxic Hypercapnic resp  Respiratory Failure   - PNA b/l    - Covid-19 infection   - AMS / Encephalopathy   - DM uncontrol   - HLD   - ÁNGEL    Plan   - Admit to ICU   - No sedation   - AMS / encephalopathy due to hypercapnia   - Continue BiPaP alternate with HFNC as tolerated   - Isolation : contact and airborne   - Remdesivir daily x 5 days   - Decadron 6 mg /day x 10 day then taper  - Will consider Tocilizumab if no sign of bacterial infection   - Monitor biomarkers daily   - Monitor blood sugar with coverage   - Asp precaution   - NPO   - IVF  - Monitor renal fx   - DVT GI prophy .

## 2022-01-14 NOTE — CONSULT NOTE ADULT - SUBJECTIVE AND OBJECTIVE BOX
Consult to: Discuss complex medical decision making related to goals of care    [facility] Consult Service:  Dr. Ginger Williamson: cell ([phone])  Dr. Kaela Fernandez: cell ([phone])   Tianna Hamilton NP: cell ([phone])  Tequlia Morales NP: cell ([phone])   Armaan Lois LSW: cell ([phone])     HPI:  81M from home, AAOX3, self ambulating with PMH of DM and HLD comes to thr ED with c.o fever and cough since 8 days. Pt appears tired, but AAOX3. Obtaining history is limited as pt is on BIPAP. Pt mentions having worsening shortness of breath today and visited the ED. He was called for RRT in the main lobby for hypoxia - spo2 61% on Room air. Pt also endorses dry cough and increased generalized weakness.   Pt denies any sick contact and stays with grand child. Pt is vaccinated with J&J.     In the ED,   Pt was started on the NRB 15L, Spo2 87% ---> started on BIPAP 16/8/100   CXR RLL infiltrate-> called for code sepsis   COVID+   ABG- Respiratory acidosis  (08 Jan 2022 12:46)      PAST MEDICAL & SURGICAL HISTORY:  Diabetes mellitus    High cholesterol        SOCIAL HISTORY:    Admitted from:  home  (with HHA)           assisted living          [facility]   [ none ] Substance abuse, [ none ] Tobacco hx, [ none ] Alcohol hx, [ none ] Home Opioid Hx    FAMILY HISTORY:   unable to obtain from patient due to poor mentation, family unable to give information, see H&P for history  Baseline ADLs (prior to admission):    Allergies    No Known Allergies    Intolerances      Present Symptoms:   Pain:  Fatigue:  Nausea:  Lack of Appetite:   SOB:  Depression:  Anxiety:  Review of Systems: [All others negative or Unable to obtain due to poor mentation]    MEDICATIONS  (STANDING):  chlorhexidine 0.12% Liquid 15 milliLiter(s) Oral Mucosa every 12 hours  chlorhexidine 2% Cloths 1 Application(s) Topical <User Schedule>  dexAMETHasone  Injectable 6 milliGRAM(s) IV Push daily  enoxaparin Injectable 40 milliGRAM(s) SubCutaneous daily  fentaNYL   Infusion. 2 MICROgram(s)/kG/Hr (17.7 mL/Hr) IV Continuous <Continuous>  insulin lispro (ADMELOG) corrective regimen sliding scale   SubCutaneous every 6 hours  ketamine Infusion. 0.3 mG/kG/Hr (2.65 mL/Hr) IV Continuous <Continuous>  norepinephrine Infusion 0.1 MICROgram(s)/kG/Min (8.29 mL/Hr) IV Continuous <Continuous>  pantoprazole  Injectable 40 milliGRAM(s) IV Push daily  phenylephrine    Infusion 0.1 MICROgram(s)/kG/Min (1.66 mL/Hr) IV Continuous <Continuous>  propofol Infusion 40 MICROgram(s)/kG/Min (21.2 mL/Hr) IV Continuous <Continuous>  senna Syrup 10 milliLiter(s) Oral daily    MEDICATIONS  (PRN):  sodium chloride 0.9% lock flush 10 milliLiter(s) IV Push every 1 hour PRN Pre/post blood products, medications, blood draw, and to maintain line patency      PHYSICAL EXAM:  Vital Signs Last 24 Hrs  T(C): 37.4 (14 Jan 2022 13:00), Max: 38.1 (13 Jan 2022 14:15)  T(F): 99.3 (14 Jan 2022 13:00), Max: 100.6 (13 Jan 2022 14:15)  HR: 114 (14 Jan 2022 13:00) (86 - 123)  BP: 98/58 (14 Jan 2022 13:00) (64/40 - 139/58)  BP(mean): 68 (14 Jan 2022 13:00) (45 - 80)  RR: 29 (14 Jan 2022 13:00) (26 - 32)  SpO2: 74% (14 Jan 2022 13:00) (73% - 92%)    General: alert  oriented x ____    lethargic distressed cachexia  nonverbal  unarousable verbal    Palliative Performance Scale/Karnofsky Score:  ECOG Performance:    HEENT: no abnormal lesion, dry mouth  ET tube/trach oral lesions:  Lungs: tachypnea/labored breathing, audible excessive secretions  CV: RRR, S1S2, tachycardia  GI: soft non distended non tender  incontinent               PEG/NG/OG tube  constipation  last BM:   : incontinent  oliguria/anuria  taylor  Musculoskeletal: weakness x4 edema x4    ambulatory with assistance   mostly/fully bedbound/wheelchair bound  Skin: no abnormal skin lesions, poor skin turgor, pressure ulcer stage:   Neuro: no deficits, cognitive impairment dsyphagia/dysarthria paresis  Oral intake ability: unable/only mouth care, minimal moderate full capability    LABS:                        17.9   15.82 )-----------( 320      ( 14 Jan 2022 05:49 )             61.5     01-14    142  |  110<H>  |  80<H>  ----------------------------<  228<H>  5.9<H>   |  30  |  2.67<H>    Ca    8.9      14 Jan 2022 05:49  Phos  5.8     01-14  Mg     3.3     01-14    TPro  6.0  /  Alb  1.5<L>  /  TBili  1.0  /  DBili  x   /  AST  38  /  ALT  27  /  AlkPhos  80  01-14        RADIOLOGY & ADDITIONAL STUDIES:

## 2022-01-14 NOTE — CHART NOTE - NSCHARTNOTEFT_GEN_A_CORE
Pts son Geovany was updated about pts worsening condition. Pts son understands that his father is actively dying and might not make it through the day. Pt remains Full Code. : [id number] and [id number]  Pts son Geovany was updated about pts worsening condition. Pts son understands that his father is actively dying and might not make it through the day. Pt remains Full Code.

## 2022-01-14 NOTE — CONSULT NOTE ADULT - PROBLEM SELECTOR RECOMMENDATION 9
2/2 COVID 19 PNA, intubated 1/10. on remdesevir, decadron, s/p paralytics. With MOF, appears to be actively dying,   oxygenating poorly despite ventilatory support, worsening ÁNGEL, on pressor support. prognosis could be anytime but likely hours to days. DNR, continue all other medical treatment.

## 2022-01-15 LAB
RAPID RVP RESULT: DETECTED
SARS-COV-2 RNA SPEC QL NAA+PROBE: DETECTED

## 2022-01-15 PROCEDURE — 80048 BASIC METABOLIC PNL TOTAL CA: CPT

## 2022-01-15 PROCEDURE — 36415 COLL VENOUS BLD VENIPUNCTURE: CPT

## 2022-01-15 PROCEDURE — 82962 GLUCOSE BLOOD TEST: CPT

## 2022-01-15 PROCEDURE — 0225U NFCT DS DNA&RNA 21 SARSCOV2: CPT

## 2022-01-15 PROCEDURE — 83605 ASSAY OF LACTIC ACID: CPT

## 2022-01-15 PROCEDURE — 83036 HEMOGLOBIN GLYCOSYLATED A1C: CPT

## 2022-01-15 PROCEDURE — 87086 URINE CULTURE/COLONY COUNT: CPT

## 2022-01-15 PROCEDURE — 83615 LACTATE (LD) (LDH) ENZYME: CPT

## 2022-01-15 PROCEDURE — 82728 ASSAY OF FERRITIN: CPT

## 2022-01-15 PROCEDURE — 87040 BLOOD CULTURE FOR BACTERIA: CPT

## 2022-01-15 PROCEDURE — 94002 VENT MGMT INPAT INIT DAY: CPT

## 2022-01-15 PROCEDURE — 85025 COMPLETE CBC W/AUTO DIFF WBC: CPT

## 2022-01-15 PROCEDURE — 81001 URINALYSIS AUTO W/SCOPE: CPT

## 2022-01-15 PROCEDURE — 82803 BLOOD GASES ANY COMBINATION: CPT

## 2022-01-15 PROCEDURE — 96375 TX/PRO/DX INJ NEW DRUG ADDON: CPT

## 2022-01-15 PROCEDURE — 71045 X-RAY EXAM CHEST 1 VIEW: CPT

## 2022-01-15 PROCEDURE — 86140 C-REACTIVE PROTEIN: CPT

## 2022-01-15 PROCEDURE — 80053 COMPREHEN METABOLIC PANEL: CPT

## 2022-01-15 PROCEDURE — 87640 STAPH A DNA AMP PROBE: CPT

## 2022-01-15 PROCEDURE — 93005 ELECTROCARDIOGRAM TRACING: CPT

## 2022-01-15 PROCEDURE — 84100 ASSAY OF PHOSPHORUS: CPT

## 2022-01-15 PROCEDURE — 94660 CPAP INITIATION&MGMT: CPT

## 2022-01-15 PROCEDURE — 83735 ASSAY OF MAGNESIUM: CPT

## 2022-01-15 PROCEDURE — 84484 ASSAY OF TROPONIN QUANT: CPT

## 2022-01-15 PROCEDURE — 85379 FIBRIN DEGRADATION QUANT: CPT

## 2022-01-15 PROCEDURE — 99291 CRITICAL CARE FIRST HOUR: CPT | Mod: 25

## 2022-01-15 PROCEDURE — 87641 MR-STAPH DNA AMP PROBE: CPT

## 2022-01-15 PROCEDURE — 94003 VENT MGMT INPAT SUBQ DAY: CPT

## 2022-01-15 PROCEDURE — 84145 PROCALCITONIN (PCT): CPT

## 2022-01-15 PROCEDURE — 96374 THER/PROPH/DIAG INJ IV PUSH: CPT

## 2022-01-15 PROCEDURE — 84478 ASSAY OF TRIGLYCERIDES: CPT

## 2022-02-04 NOTE — PATIENT PROFILE ADULT - TOBACCO USE
Unknown if ever smoked Bill For Surgical Tray: no Expected Date Of Service: 02/04/2022 Billing Type: Third-Party Bill